# Patient Record
Sex: FEMALE | Race: WHITE | Employment: FULL TIME | URBAN - METROPOLITAN AREA
[De-identification: names, ages, dates, MRNs, and addresses within clinical notes are randomized per-mention and may not be internally consistent; named-entity substitution may affect disease eponyms.]

---

## 2018-04-13 ENCOUNTER — APPOINTMENT (OUTPATIENT)
Dept: GENERAL RADIOLOGY | Age: 43
End: 2018-04-13
Payer: COMMERCIAL

## 2018-04-13 ENCOUNTER — HOSPITAL ENCOUNTER (EMERGENCY)
Age: 43
Discharge: HOME | End: 2018-04-13
Attending: EMERGENCY MEDICINE
Payer: COMMERCIAL

## 2018-04-13 VITALS
BODY MASS INDEX: 40.48 KG/M2 | RESPIRATION RATE: 18 BRPM | HEIGHT: 62 IN | TEMPERATURE: 97.9 F | DIASTOLIC BLOOD PRESSURE: 101 MMHG | SYSTOLIC BLOOD PRESSURE: 149 MMHG | HEART RATE: 108 BPM | OXYGEN SATURATION: 97 % | WEIGHT: 220 LBS

## 2018-04-13 DIAGNOSIS — S92.912A: Primary | ICD-10-CM

## 2018-04-13 PROCEDURE — 73562 X-RAY EXAM OF KNEE 3: CPT

## 2018-04-13 PROCEDURE — 73590 X-RAY EXAM OF LOWER LEG: CPT

## 2018-04-13 PROCEDURE — 6360000002 HC RX W HCPCS: Performed by: STUDENT IN AN ORGANIZED HEALTH CARE EDUCATION/TRAINING PROGRAM

## 2018-04-13 PROCEDURE — 90715 TDAP VACCINE 7 YRS/> IM: CPT | Performed by: STUDENT IN AN ORGANIZED HEALTH CARE EDUCATION/TRAINING PROGRAM

## 2018-04-13 PROCEDURE — 73630 X-RAY EXAM OF FOOT: CPT

## 2018-04-13 PROCEDURE — 73610 X-RAY EXAM OF ANKLE: CPT

## 2018-04-13 PROCEDURE — 96374 THER/PROPH/DIAG INJ IV PUSH: CPT

## 2018-04-13 PROCEDURE — G0382 LEV 3 HOSP TYPE B ED VISIT: HCPCS

## 2018-04-13 PROCEDURE — 90471 IMMUNIZATION ADMIN: CPT | Performed by: STUDENT IN AN ORGANIZED HEALTH CARE EDUCATION/TRAINING PROGRAM

## 2018-04-13 RX ORDER — KETOROLAC TROMETHAMINE 15 MG/ML
15 INJECTION, SOLUTION INTRAMUSCULAR; INTRAVENOUS ONCE
Status: COMPLETED | OUTPATIENT
Start: 2018-04-13 | End: 2018-04-13

## 2018-04-13 RX ADMIN — KETOROLAC TROMETHAMINE 15 MG: 15 INJECTION, SOLUTION INTRAMUSCULAR; INTRAVENOUS at 12:55

## 2018-04-13 RX ADMIN — TETANUS TOXOID, REDUCED DIPHTHERIA TOXOID AND ACELLULAR PERTUSSIS VACCINE, ADSORBED 0.5 ML: 5; 2.5; 8; 8; 2.5 SUSPENSION INTRAMUSCULAR at 13:43

## 2018-04-13 ASSESSMENT — ENCOUNTER SYMPTOMS
VOMITING: 0
ABDOMINAL PAIN: 0
COUGH: 0
SHORTNESS OF BREATH: 0
COLOR CHANGE: 1
WHEEZING: 0
NAUSEA: 0
CONSTIPATION: 0
DIARRHEA: 0

## 2018-04-13 ASSESSMENT — PAIN DESCRIPTION - ORIENTATION: ORIENTATION: LEFT

## 2018-04-13 ASSESSMENT — PAIN SCALES - GENERAL
PAINLEVEL_OUTOF10: 7
PAINLEVEL_OUTOF10: 7

## 2018-04-13 ASSESSMENT — PAIN DESCRIPTION - DESCRIPTORS: DESCRIPTORS: DISCOMFORT;SORE

## 2018-04-13 ASSESSMENT — PAIN DESCRIPTION - LOCATION: LOCATION: LEG

## 2018-05-31 ENCOUNTER — HOSPITAL ENCOUNTER (OUTPATIENT)
Age: 43
Setting detail: SPECIMEN
Discharge: HOME | End: 2018-05-31
Payer: COMMERCIAL

## 2018-05-31 DIAGNOSIS — Z01.818 PREOPERATIVE CLEARANCE: ICD-10-CM

## 2018-05-31 LAB
ABSOLUTE EOS #: 0.23 K/UL (ref 0–0.44)
ABSOLUTE IMMATURE GRANULOCYTE: 0.06 K/UL (ref 0–0.3)
ABSOLUTE LYMPH #: 1.82 K/UL (ref 1.1–3.7)
ABSOLUTE MONO #: 0.75 K/UL (ref 0.1–1.2)
ANION GAP SERPL CALCULATED.3IONS-SCNC: 17 MMOL/L (ref 9–17)
BASOPHILS # BLD: 1 % (ref 0–2)
BASOPHILS ABSOLUTE: 0.08 K/UL (ref 0–0.2)
BUN BLDV-MCNC: 8 MG/DL (ref 6–20)
BUN/CREAT BLD: NORMAL (ref 9–20)
CALCIUM SERPL-MCNC: 9.1 MG/DL (ref 8.6–10.4)
CHLORIDE BLD-SCNC: 100 MMOL/L (ref 98–107)
CO2: 23 MMOL/L (ref 20–31)
CREAT SERPL-MCNC: 0.71 MG/DL (ref 0.5–0.9)
DIFFERENTIAL TYPE: ABNORMAL
EOSINOPHILS RELATIVE PERCENT: 3 % (ref 1–4)
GFR AFRICAN AMERICAN: >60 ML/MIN
GFR NON-AFRICAN AMERICAN: >60 ML/MIN
GFR SERPL CREATININE-BSD FRML MDRD: NORMAL ML/MIN/{1.73_M2}
GFR SERPL CREATININE-BSD FRML MDRD: NORMAL ML/MIN/{1.73_M2}
GLUCOSE BLD-MCNC: 97 MG/DL (ref 70–99)
HCT VFR BLD CALC: 41.6 % (ref 36.3–47.1)
HEMOGLOBIN: 13.6 G/DL (ref 11.9–15.1)
IMMATURE GRANULOCYTES: 1 %
LYMPHOCYTES # BLD: 21 % (ref 24–43)
MCH RBC QN AUTO: 30.8 PG (ref 25.2–33.5)
MCHC RBC AUTO-ENTMCNC: 32.7 G/DL (ref 28.4–34.8)
MCV RBC AUTO: 94.1 FL (ref 82.6–102.9)
MONOCYTES # BLD: 9 % (ref 3–12)
NRBC AUTOMATED: 0 PER 100 WBC
PDW BLD-RTO: 12.9 % (ref 11.8–14.4)
PLATELET # BLD: 386 K/UL (ref 138–453)
PLATELET ESTIMATE: ABNORMAL
PMV BLD AUTO: 11.3 FL (ref 8.1–13.5)
POTASSIUM SERPL-SCNC: 4.5 MMOL/L (ref 3.7–5.3)
RBC # BLD: 4.42 M/UL (ref 3.95–5.11)
RBC # BLD: ABNORMAL 10*6/UL
SEG NEUTROPHILS: 65 % (ref 36–65)
SEGMENTED NEUTROPHILS ABSOLUTE COUNT: 5.91 K/UL (ref 1.5–8.1)
SODIUM BLD-SCNC: 140 MMOL/L (ref 135–144)
WBC # BLD: 8.9 K/UL (ref 3.5–11.3)
WBC # BLD: ABNORMAL 10*3/UL

## 2018-06-11 ENCOUNTER — HOSPITAL ENCOUNTER (OUTPATIENT)
Dept: MAMMOGRAPHY | Facility: CLINIC | Age: 43
Discharge: HOME | End: 2018-06-13
Payer: COMMERCIAL

## 2018-06-11 ENCOUNTER — HOSPITAL ENCOUNTER (OUTPATIENT)
Age: 43
Setting detail: SPECIMEN
Discharge: HOME | End: 2018-06-11
Payer: COMMERCIAL

## 2018-06-11 DIAGNOSIS — Z01.419 WELL WOMAN EXAM WITH ROUTINE GYNECOLOGICAL EXAM: ICD-10-CM

## 2018-06-11 DIAGNOSIS — Z12.39 SCREENING FOR BREAST CANCER: ICD-10-CM

## 2018-06-11 PROCEDURE — 77067 SCR MAMMO BI INCL CAD: CPT

## 2018-06-13 LAB
HPV SAMPLE: NORMAL
HPV SOURCE: NORMAL
HPV, GENOTYPE 16: NOT DETECTED
HPV, GENOTYPE 18: NOT DETECTED
HPV, HIGH RISK OTHER: NOT DETECTED
HPV, INTERPRETATION: NORMAL

## 2018-06-28 LAB — CYTOLOGY REPORT: NORMAL

## 2019-04-17 ENCOUNTER — HOSPITAL ENCOUNTER (OUTPATIENT)
Age: 44
Setting detail: SPECIMEN
Discharge: HOME | End: 2019-04-17
Payer: COMMERCIAL

## 2019-04-17 DIAGNOSIS — R63.5 WEIGHT GAIN: ICD-10-CM

## 2019-04-23 LAB
CORTISOL (UR), FREE: 12.4 UG/D
CORTISOL URINE, FREE (/G CRT): 4.2 UG/L
CORTISOL, URINE RATIO CREATININE: 7.92 UG/G CRT
CORTISOL, URINE: NORMAL
CREATININE URINE /24 HR: 1568 MG/D (ref 700–1600)
CREATININE URINE /VOLUME: 53 MG/DL
HOURS COLLECTED: 24
URINE VOLUME: 2959

## 2019-09-20 ENCOUNTER — TELEPHONE (OUTPATIENT)
Dept: GASTROENTEROLOGY | Age: 44
End: 2019-09-20

## 2019-10-16 ENCOUNTER — OFFICE VISIT (OUTPATIENT)
Dept: GASTROENTEROLOGY | Age: 44
End: 2019-10-16
Payer: COMMERCIAL

## 2019-10-16 VITALS
SYSTOLIC BLOOD PRESSURE: 162 MMHG | HEART RATE: 70 BPM | BODY MASS INDEX: 46.4 KG/M2 | WEIGHT: 253.7 LBS | DIASTOLIC BLOOD PRESSURE: 99 MMHG

## 2019-10-16 DIAGNOSIS — R19.7 DIARRHEA, UNSPECIFIED TYPE: Primary | ICD-10-CM

## 2019-10-16 DIAGNOSIS — R14.0 BLOATING: ICD-10-CM

## 2019-10-16 DIAGNOSIS — K21.9 GASTROESOPHAGEAL REFLUX DISEASE, ESOPHAGITIS PRESENCE NOT SPECIFIED: ICD-10-CM

## 2019-10-16 PROCEDURE — 99244 OFF/OP CNSLTJ NEW/EST MOD 40: CPT | Performed by: INTERNAL MEDICINE

## 2019-10-16 RX ORDER — POLYETHYLENE GLYCOL 3350 17 G/17G
POWDER, FOR SOLUTION ORAL
Qty: 238 G | Refills: 0 | Status: SHIPPED | OUTPATIENT
Start: 2019-10-16 | End: 2019-11-15

## 2019-10-16 ASSESSMENT — ENCOUNTER SYMPTOMS
SORE THROAT: 0
VOMITING: 0
TROUBLE SWALLOWING: 0
CONSTIPATION: 0
WHEEZING: 0
COUGH: 0
BLOOD IN STOOL: 0
ABDOMINAL DISTENTION: 1
NAUSEA: 1
ALLERGIC/IMMUNOLOGIC NEGATIVE: 1
CHOKING: 1
SINUS PRESSURE: 0
VOICE CHANGE: 1
RECTAL PAIN: 0
BACK PAIN: 1
ABDOMINAL PAIN: 1
DIARRHEA: 0
ANAL BLEEDING: 0

## 2019-10-17 ENCOUNTER — HOSPITAL ENCOUNTER (OUTPATIENT)
Age: 44
Setting detail: SPECIMEN
Discharge: HOME | End: 2019-10-17
Payer: COMMERCIAL

## 2019-10-17 DIAGNOSIS — R19.7 DIARRHEA, UNSPECIFIED TYPE: ICD-10-CM

## 2019-10-17 LAB
ABSOLUTE EOS #: 0.28 K/UL (ref 0–0.44)
ABSOLUTE IMMATURE GRANULOCYTE: 0.1 K/UL (ref 0–0.3)
ABSOLUTE LYMPH #: 2.38 K/UL (ref 1.1–3.7)
ABSOLUTE MONO #: 0.94 K/UL (ref 0.1–1.2)
ALBUMIN SERPL-MCNC: 4.4 G/DL (ref 3.5–5.2)
ALBUMIN/GLOBULIN RATIO: 1.4 (ref 1–2.5)
ALP BLD-CCNC: 77 U/L (ref 35–104)
ALT SERPL-CCNC: 111 U/L (ref 5–33)
ANION GAP SERPL CALCULATED.3IONS-SCNC: 13 MMOL/L (ref 9–17)
AST SERPL-CCNC: 60 U/L
BASOPHILS # BLD: 1 % (ref 0–2)
BASOPHILS ABSOLUTE: 0.1 K/UL (ref 0–0.2)
BILIRUB SERPL-MCNC: 0.6 MG/DL (ref 0.3–1.2)
BUN BLDV-MCNC: 10 MG/DL (ref 6–20)
BUN/CREAT BLD: ABNORMAL (ref 9–20)
CALCIUM SERPL-MCNC: 9.4 MG/DL (ref 8.6–10.4)
CHLORIDE BLD-SCNC: 103 MMOL/L (ref 98–107)
CO2: 26 MMOL/L (ref 20–31)
CREAT SERPL-MCNC: 0.76 MG/DL (ref 0.5–0.9)
DIFFERENTIAL TYPE: ABNORMAL
EOSINOPHILS RELATIVE PERCENT: 3 % (ref 1–4)
GFR AFRICAN AMERICAN: >60 ML/MIN
GFR NON-AFRICAN AMERICAN: >60 ML/MIN
GFR SERPL CREATININE-BSD FRML MDRD: ABNORMAL ML/MIN/{1.73_M2}
GFR SERPL CREATININE-BSD FRML MDRD: ABNORMAL ML/MIN/{1.73_M2}
GLUCOSE BLD-MCNC: 111 MG/DL (ref 70–99)
HCT VFR BLD CALC: 45 % (ref 36.3–47.1)
HEMOGLOBIN: 14 G/DL (ref 11.9–15.1)
IMMATURE GRANULOCYTES: 1 %
LYMPHOCYTES # BLD: 27 % (ref 24–43)
MCH RBC QN AUTO: 30.5 PG (ref 25.2–33.5)
MCHC RBC AUTO-ENTMCNC: 31.1 G/DL (ref 28.4–34.8)
MCV RBC AUTO: 98 FL (ref 82.6–102.9)
MONOCYTES # BLD: 11 % (ref 3–12)
NRBC AUTOMATED: 0 PER 100 WBC
PDW BLD-RTO: 12.9 % (ref 11.8–14.4)
PLATELET # BLD: 291 K/UL (ref 138–453)
PLATELET ESTIMATE: ABNORMAL
PMV BLD AUTO: 12.3 FL (ref 8.1–13.5)
POTASSIUM SERPL-SCNC: 5 MMOL/L (ref 3.7–5.3)
RBC # BLD: 4.59 M/UL (ref 3.95–5.11)
RBC # BLD: ABNORMAL 10*6/UL
SEDIMENTATION RATE, ERYTHROCYTE: 15 MM (ref 0–20)
SEG NEUTROPHILS: 57 % (ref 36–65)
SEGMENTED NEUTROPHILS ABSOLUTE COUNT: 4.98 K/UL (ref 1.5–8.1)
SODIUM BLD-SCNC: 142 MMOL/L (ref 135–144)
TOTAL PROTEIN: 7.5 G/DL (ref 6.4–8.3)
WBC # BLD: 8.8 K/UL (ref 3.5–11.3)
WBC # BLD: ABNORMAL 10*3/UL

## 2019-10-18 LAB — TISSUE TRANSGLUTAMINASE IGA: 1 U/ML

## 2019-10-21 ENCOUNTER — TELEPHONE (OUTPATIENT)
Dept: GASTROENTEROLOGY | Age: 44
End: 2019-10-21

## 2019-10-23 ENCOUNTER — ANESTHESIA (OUTPATIENT)
Dept: ENDOSCOPY | Age: 44
End: 2019-10-23
Payer: COMMERCIAL

## 2019-10-23 ENCOUNTER — ANESTHESIA EVENT (OUTPATIENT)
Dept: ENDOSCOPY | Age: 44
End: 2019-10-23
Payer: COMMERCIAL

## 2019-10-23 ENCOUNTER — HOSPITAL ENCOUNTER (OUTPATIENT)
Age: 44
Setting detail: OUTPATIENT SURGERY
Discharge: HOME | End: 2019-10-23
Attending: INTERNAL MEDICINE | Admitting: INTERNAL MEDICINE
Payer: COMMERCIAL

## 2019-10-23 VITALS
SYSTOLIC BLOOD PRESSURE: 138 MMHG | TEMPERATURE: 97.7 F | RESPIRATION RATE: 12 BRPM | BODY MASS INDEX: 44.16 KG/M2 | HEART RATE: 64 BPM | DIASTOLIC BLOOD PRESSURE: 86 MMHG | OXYGEN SATURATION: 97 % | HEIGHT: 62 IN | WEIGHT: 240 LBS

## 2019-10-23 VITALS — SYSTOLIC BLOOD PRESSURE: 145 MMHG | OXYGEN SATURATION: 100 % | DIASTOLIC BLOOD PRESSURE: 66 MMHG

## 2019-10-23 PROCEDURE — 7100000001 HC PACU RECOVERY - ADDTL 15 MIN: Performed by: INTERNAL MEDICINE

## 2019-10-23 PROCEDURE — 6370000000 HC RX 637 (ALT 250 FOR IP): Performed by: ANESTHESIOLOGY

## 2019-10-23 PROCEDURE — 2580000003 HC RX 258: Performed by: ANESTHESIOLOGY

## 2019-10-23 PROCEDURE — 6360000002 HC RX W HCPCS: Performed by: ANESTHESIOLOGY

## 2019-10-23 PROCEDURE — 2500000003 HC RX 250 WO HCPCS: Performed by: NURSE ANESTHETIST, CERTIFIED REGISTERED

## 2019-10-23 PROCEDURE — 43239 EGD BIOPSY SINGLE/MULTIPLE: CPT | Performed by: INTERNAL MEDICINE

## 2019-10-23 PROCEDURE — 7100000000 HC PACU RECOVERY - FIRST 15 MIN: Performed by: INTERNAL MEDICINE

## 2019-10-23 PROCEDURE — 3700000000 HC ANESTHESIA ATTENDED CARE: Performed by: INTERNAL MEDICINE

## 2019-10-23 PROCEDURE — 3700000001 HC ADD 15 MINUTES (ANESTHESIA): Performed by: INTERNAL MEDICINE

## 2019-10-23 PROCEDURE — 88305 TISSUE EXAM BY PATHOLOGIST: CPT

## 2019-10-23 PROCEDURE — 6360000002 HC RX W HCPCS: Performed by: NURSE ANESTHETIST, CERTIFIED REGISTERED

## 2019-10-23 PROCEDURE — 2709999900 HC NON-CHARGEABLE SUPPLY: Performed by: INTERNAL MEDICINE

## 2019-10-23 PROCEDURE — 3609010300 HC COLONOSCOPY W/BIOPSY SINGLE/MULTIPLE: Performed by: INTERNAL MEDICINE

## 2019-10-23 PROCEDURE — 3609012400 HC EGD TRANSORAL BIOPSY SINGLE/MULTIPLE: Performed by: INTERNAL MEDICINE

## 2019-10-23 PROCEDURE — 45380 COLONOSCOPY AND BIOPSY: CPT | Performed by: INTERNAL MEDICINE

## 2019-10-23 RX ORDER — SODIUM CHLORIDE, SODIUM LACTATE, POTASSIUM CHLORIDE, CALCIUM CHLORIDE 600; 310; 30; 20 MG/100ML; MG/100ML; MG/100ML; MG/100ML
INJECTION, SOLUTION INTRAVENOUS CONTINUOUS
Status: DISCONTINUED | OUTPATIENT
Start: 2019-10-23 | End: 2019-10-23 | Stop reason: HOSPADM

## 2019-10-23 RX ORDER — PROMETHAZINE HYDROCHLORIDE 25 MG/ML
6.25 INJECTION, SOLUTION INTRAMUSCULAR; INTRAVENOUS
Status: DISCONTINUED | OUTPATIENT
Start: 2019-10-23 | End: 2019-10-23 | Stop reason: CLARIF

## 2019-10-23 RX ORDER — DIPHENHYDRAMINE HYDROCHLORIDE 50 MG/ML
12.5 INJECTION INTRAMUSCULAR; INTRAVENOUS
Status: DISCONTINUED | OUTPATIENT
Start: 2019-10-23 | End: 2019-10-23 | Stop reason: HOSPADM

## 2019-10-23 RX ORDER — PROPOFOL 10 MG/ML
INJECTION, EMULSION INTRAVENOUS PRN
Status: DISCONTINUED | OUTPATIENT
Start: 2019-10-23 | End: 2019-10-23 | Stop reason: SDUPTHER

## 2019-10-23 RX ORDER — 0.9 % SODIUM CHLORIDE 0.9 %
500 INTRAVENOUS SOLUTION INTRAVENOUS
Status: DISCONTINUED | OUTPATIENT
Start: 2019-10-23 | End: 2019-10-23 | Stop reason: HOSPADM

## 2019-10-23 RX ORDER — HYDRALAZINE HYDROCHLORIDE 20 MG/ML
5 INJECTION INTRAMUSCULAR; INTRAVENOUS EVERY 10 MIN PRN
Status: DISCONTINUED | OUTPATIENT
Start: 2019-10-23 | End: 2019-10-23 | Stop reason: HOSPADM

## 2019-10-23 RX ORDER — LIDOCAINE HYDROCHLORIDE 10 MG/ML
INJECTION, SOLUTION EPIDURAL; INFILTRATION; INTRACAUDAL; PERINEURAL PRN
Status: DISCONTINUED | OUTPATIENT
Start: 2019-10-23 | End: 2019-10-23 | Stop reason: SDUPTHER

## 2019-10-23 RX ORDER — SCOLOPAMINE TRANSDERMAL SYSTEM 1 MG/1
1 PATCH, EXTENDED RELEASE TRANSDERMAL
Status: DISCONTINUED | OUTPATIENT
Start: 2019-10-23 | End: 2019-10-23 | Stop reason: HOSPADM

## 2019-10-23 RX ORDER — ONDANSETRON 2 MG/ML
4 INJECTION INTRAMUSCULAR; INTRAVENOUS
Status: COMPLETED | OUTPATIENT
Start: 2019-10-23 | End: 2019-10-23

## 2019-10-23 RX ORDER — LABETALOL 20 MG/4 ML (5 MG/ML) INTRAVENOUS SYRINGE
5 EVERY 10 MIN PRN
Status: DISCONTINUED | OUTPATIENT
Start: 2019-10-23 | End: 2019-10-23 | Stop reason: HOSPADM

## 2019-10-23 RX ADMIN — ONDANSETRON 4 MG: 2 INJECTION INTRAMUSCULAR; INTRAVENOUS at 07:39

## 2019-10-23 RX ADMIN — SODIUM CHLORIDE, POTASSIUM CHLORIDE, SODIUM LACTATE AND CALCIUM CHLORIDE: 600; 310; 30; 20 INJECTION, SOLUTION INTRAVENOUS at 06:49

## 2019-10-23 RX ADMIN — PROPOFOL 250 MG: 10 INJECTION, EMULSION INTRAVENOUS at 07:42

## 2019-10-23 RX ADMIN — LIDOCAINE HYDROCHLORIDE 50 MG: 10 INJECTION, SOLUTION EPIDURAL; INFILTRATION; INTRACAUDAL at 07:42

## 2019-10-23 RX ADMIN — PROPOFOL 370 MG: 10 INJECTION, EMULSION INTRAVENOUS at 07:53

## 2019-10-23 RX ADMIN — PROMETHAZINE HYDROCHLORIDE 6.25 MG: 25 INJECTION INTRAMUSCULAR; INTRAVENOUS at 08:49

## 2019-10-23 ASSESSMENT — PULMONARY FUNCTION TESTS
PIF_VALUE: 0
PIF_VALUE: 1
PIF_VALUE: 0

## 2019-10-23 ASSESSMENT — PAIN SCALES - GENERAL: PAINLEVEL_OUTOF10: 0

## 2019-10-23 ASSESSMENT — PAIN - FUNCTIONAL ASSESSMENT: PAIN_FUNCTIONAL_ASSESSMENT: 0-10

## 2019-10-23 ASSESSMENT — PAIN DESCRIPTION - DESCRIPTORS: DESCRIPTORS: ACHING

## 2019-10-24 LAB — SURGICAL PATHOLOGY REPORT: NORMAL

## 2019-10-28 PROBLEM — Z86.010 HISTORY OF COLON POLYPS: Status: ACTIVE | Noted: 2019-10-23

## 2019-10-28 PROBLEM — Z86.0100 HISTORY OF COLON POLYPS: Status: ACTIVE | Noted: 2019-10-23

## 2019-12-02 ENCOUNTER — OFFICE VISIT (OUTPATIENT)
Dept: GASTROENTEROLOGY | Age: 44
End: 2019-12-02
Payer: COMMERCIAL

## 2019-12-02 VITALS
DIASTOLIC BLOOD PRESSURE: 106 MMHG | BODY MASS INDEX: 46.73 KG/M2 | SYSTOLIC BLOOD PRESSURE: 154 MMHG | WEIGHT: 255.5 LBS | HEART RATE: 94 BPM

## 2019-12-02 DIAGNOSIS — K21.9 GASTROESOPHAGEAL REFLUX DISEASE, ESOPHAGITIS PRESENCE NOT SPECIFIED: ICD-10-CM

## 2019-12-02 DIAGNOSIS — R14.0 BLOATING: ICD-10-CM

## 2019-12-02 DIAGNOSIS — R19.7 DIARRHEA, UNSPECIFIED TYPE: Primary | ICD-10-CM

## 2019-12-02 PROCEDURE — 99213 OFFICE O/P EST LOW 20 MIN: CPT | Performed by: INTERNAL MEDICINE

## 2019-12-02 ASSESSMENT — ENCOUNTER SYMPTOMS
SORE THROAT: 0
RECTAL PAIN: 0
NAUSEA: 0
TROUBLE SWALLOWING: 0
RESPIRATORY NEGATIVE: 1
ANAL BLEEDING: 0
CHOKING: 0
ALLERGIC/IMMUNOLOGIC NEGATIVE: 1
VOMITING: 0
BACK PAIN: 1
WHEEZING: 0
COUGH: 0
VOICE CHANGE: 0
ABDOMINAL PAIN: 0
ABDOMINAL DISTENTION: 1
SINUS PRESSURE: 1
BLOOD IN STOOL: 0
DIARRHEA: 1
CONSTIPATION: 1

## 2020-01-08 ENCOUNTER — HOSPITAL ENCOUNTER (OUTPATIENT)
Age: 45
Setting detail: SPECIMEN
Discharge: HOME | End: 2020-01-08
Payer: COMMERCIAL

## 2020-01-09 LAB
C DIFF AG + TOXIN: NEGATIVE
CAMPYLOBACTER PCR: ABNORMAL
E COLI ENTEROTOXIGENIC PCR: ABNORMAL
PLESIOMONAS SHIGELLOIDES PCR: ABNORMAL
SALMONELLA PCR: ABNORMAL
SHIGATOXIN GENE PCR: ABNORMAL
SHIGELLA SP PCR: ABNORMAL
SPECIMEN DESCRIPTION: ABNORMAL
SPECIMEN DESCRIPTION: NORMAL
VIBRIO PCR: ABNORMAL
YERSINIA ENTEROCOLITICA PCR: ABNORMAL

## 2020-01-17 ENCOUNTER — APPOINTMENT (OUTPATIENT)
Dept: CT IMAGING | Age: 45
End: 2020-01-17
Payer: COMMERCIAL

## 2020-01-17 ENCOUNTER — APPOINTMENT (OUTPATIENT)
Dept: ULTRASOUND IMAGING | Age: 45
End: 2020-01-17
Payer: COMMERCIAL

## 2020-01-17 ENCOUNTER — APPOINTMENT (OUTPATIENT)
Dept: GENERAL RADIOLOGY | Age: 45
End: 2020-01-17
Payer: COMMERCIAL

## 2020-01-17 ENCOUNTER — HOSPITAL ENCOUNTER (OUTPATIENT)
Age: 45
Setting detail: OBSERVATION
Discharge: HOME | End: 2020-01-17
Attending: EMERGENCY MEDICINE | Admitting: EMERGENCY MEDICINE
Payer: COMMERCIAL

## 2020-01-17 VITALS
RESPIRATION RATE: 16 BRPM | HEART RATE: 80 BPM | WEIGHT: 254.6 LBS | BODY MASS INDEX: 46.85 KG/M2 | HEIGHT: 62 IN | DIASTOLIC BLOOD PRESSURE: 78 MMHG | OXYGEN SATURATION: 96 % | TEMPERATURE: 98.6 F | SYSTOLIC BLOOD PRESSURE: 144 MMHG

## 2020-01-17 PROBLEM — R07.9 CHEST PAIN: Status: ACTIVE | Noted: 2020-01-17

## 2020-01-17 LAB
ABSOLUTE EOS #: 0.19 K/UL (ref 0–0.44)
ABSOLUTE EOS #: 0.22 K/UL (ref 0–0.44)
ABSOLUTE IMMATURE GRANULOCYTE: 0.12 K/UL (ref 0–0.3)
ABSOLUTE IMMATURE GRANULOCYTE: 0.2 K/UL (ref 0–0.3)
ABSOLUTE LYMPH #: 2.52 K/UL (ref 1.1–3.7)
ABSOLUTE LYMPH #: 3.12 K/UL (ref 1.1–3.7)
ABSOLUTE MONO #: 0.92 K/UL (ref 0.1–1.2)
ABSOLUTE MONO #: 1.31 K/UL (ref 0.1–1.2)
ALBUMIN SERPL-MCNC: 3.5 G/DL (ref 3.5–5.2)
ALBUMIN SERPL-MCNC: 4 G/DL (ref 3.5–5.2)
ALBUMIN/GLOBULIN RATIO: 1.3 (ref 1–2.5)
ALBUMIN/GLOBULIN RATIO: 1.3 (ref 1–2.5)
ALP BLD-CCNC: 54 U/L (ref 35–104)
ALP BLD-CCNC: 64 U/L (ref 35–104)
ALT SERPL-CCNC: 31 U/L (ref 5–33)
ALT SERPL-CCNC: 36 U/L (ref 5–33)
ANION GAP SERPL CALCULATED.3IONS-SCNC: 12 MMOL/L (ref 9–17)
ANION GAP SERPL CALCULATED.3IONS-SCNC: 13 MMOL/L (ref 9–17)
AST SERPL-CCNC: 20 U/L
AST SERPL-CCNC: 27 U/L
BASOPHILS # BLD: 1 % (ref 0–2)
BASOPHILS # BLD: 1 % (ref 0–2)
BASOPHILS ABSOLUTE: 0.08 K/UL (ref 0–0.2)
BASOPHILS ABSOLUTE: 0.08 K/UL (ref 0–0.2)
BILIRUB SERPL-MCNC: 0.45 MG/DL (ref 0.3–1.2)
BILIRUB SERPL-MCNC: 0.65 MG/DL (ref 0.3–1.2)
BILIRUBIN DIRECT: 0.15 MG/DL
BILIRUBIN, INDIRECT: 0.3 MG/DL (ref 0–1)
BUN BLDV-MCNC: 10 MG/DL (ref 6–20)
BUN BLDV-MCNC: 8 MG/DL (ref 6–20)
BUN/CREAT BLD: ABNORMAL (ref 9–20)
BUN/CREAT BLD: ABNORMAL (ref 9–20)
CALCIUM SERPL-MCNC: 8.4 MG/DL (ref 8.6–10.4)
CALCIUM SERPL-MCNC: 8.6 MG/DL (ref 8.6–10.4)
CHLORIDE BLD-SCNC: 104 MMOL/L (ref 98–107)
CHLORIDE BLD-SCNC: 99 MMOL/L (ref 98–107)
CO2: 23 MMOL/L (ref 20–31)
CO2: 24 MMOL/L (ref 20–31)
CREAT SERPL-MCNC: 0.69 MG/DL (ref 0.5–0.9)
CREAT SERPL-MCNC: 0.93 MG/DL (ref 0.5–0.9)
D-DIMER QUANTITATIVE: 1.25 MG/L FEU
DIFFERENTIAL TYPE: ABNORMAL
DIFFERENTIAL TYPE: ABNORMAL
EOSINOPHILS RELATIVE PERCENT: 2 % (ref 1–4)
EOSINOPHILS RELATIVE PERCENT: 2 % (ref 1–4)
GFR AFRICAN AMERICAN: >60 ML/MIN
GFR AFRICAN AMERICAN: >60 ML/MIN
GFR NON-AFRICAN AMERICAN: >60 ML/MIN
GFR NON-AFRICAN AMERICAN: >60 ML/MIN
GFR SERPL CREATININE-BSD FRML MDRD: ABNORMAL ML/MIN/{1.73_M2}
GLOBULIN: ABNORMAL G/DL (ref 1.5–3.8)
GLUCOSE BLD-MCNC: 104 MG/DL (ref 70–99)
GLUCOSE BLD-MCNC: 75 MG/DL (ref 70–99)
HCT VFR BLD CALC: 37.6 % (ref 36.3–47.1)
HCT VFR BLD CALC: 39.7 % (ref 36.3–47.1)
HEMOGLOBIN: 12 G/DL (ref 11.9–15.1)
HEMOGLOBIN: 12.8 G/DL (ref 11.9–15.1)
IMMATURE GRANULOCYTES: 1 %
IMMATURE GRANULOCYTES: 1 %
LIPASE: 114 U/L (ref 13–60)
LIPASE: 50 U/L (ref 13–60)
LYMPHOCYTES # BLD: 22 % (ref 24–43)
LYMPHOCYTES # BLD: 23 % (ref 24–43)
MCH RBC QN AUTO: 29.6 PG (ref 25.2–33.5)
MCH RBC QN AUTO: 29.7 PG (ref 25.2–33.5)
MCHC RBC AUTO-ENTMCNC: 31.9 G/DL (ref 28.4–34.8)
MCHC RBC AUTO-ENTMCNC: 32.2 G/DL (ref 28.4–34.8)
MCV RBC AUTO: 92.1 FL (ref 82.6–102.9)
MCV RBC AUTO: 92.8 FL (ref 82.6–102.9)
MONOCYTES # BLD: 8 % (ref 3–12)
MONOCYTES # BLD: 9 % (ref 3–12)
NRBC AUTOMATED: 0 PER 100 WBC
NRBC AUTOMATED: 0 PER 100 WBC
PDW BLD-RTO: 13 % (ref 11.8–14.4)
PDW BLD-RTO: 13 % (ref 11.8–14.4)
PLATELET # BLD: 329 K/UL (ref 138–453)
PLATELET # BLD: 363 K/UL (ref 138–453)
PLATELET ESTIMATE: ABNORMAL
PLATELET ESTIMATE: ABNORMAL
PMV BLD AUTO: 10.7 FL (ref 8.1–13.5)
PMV BLD AUTO: 10.9 FL (ref 8.1–13.5)
POTASSIUM SERPL-SCNC: 3.4 MMOL/L (ref 3.7–5.3)
POTASSIUM SERPL-SCNC: 3.5 MMOL/L (ref 3.7–5.3)
RBC # BLD: 4.05 M/UL (ref 3.95–5.11)
RBC # BLD: 4.31 M/UL (ref 3.95–5.11)
RBC # BLD: ABNORMAL 10*6/UL
RBC # BLD: ABNORMAL 10*6/UL
SEG NEUTROPHILS: 65 % (ref 36–65)
SEG NEUTROPHILS: 65 % (ref 36–65)
SEGMENTED NEUTROPHILS ABSOLUTE COUNT: 7.26 K/UL (ref 1.5–8.1)
SEGMENTED NEUTROPHILS ABSOLUTE COUNT: 9.49 K/UL (ref 1.5–8.1)
SODIUM BLD-SCNC: 136 MMOL/L (ref 135–144)
SODIUM BLD-SCNC: 139 MMOL/L (ref 135–144)
TOTAL PROTEIN: 6.3 G/DL (ref 6.4–8.3)
TOTAL PROTEIN: 7.2 G/DL (ref 6.4–8.3)
TROPONIN INTERP: NORMAL
TROPONIN T: NORMAL NG/ML
TROPONIN, HIGH SENSITIVITY: <6 NG/L (ref 0–14)
WBC # BLD: 11.1 K/UL (ref 3.5–11.3)
WBC # BLD: 14.4 K/UL (ref 3.5–11.3)
WBC # BLD: ABNORMAL 10*3/UL
WBC # BLD: ABNORMAL 10*3/UL

## 2020-01-17 PROCEDURE — 71275 CT ANGIOGRAPHY CHEST: CPT

## 2020-01-17 PROCEDURE — 80053 COMPREHEN METABOLIC PANEL: CPT

## 2020-01-17 PROCEDURE — 93005 ELECTROCARDIOGRAM TRACING: CPT | Performed by: EMERGENCY MEDICINE

## 2020-01-17 PROCEDURE — 96375 TX/PRO/DX INJ NEW DRUG ADDON: CPT

## 2020-01-17 PROCEDURE — 85025 COMPLETE CBC W/AUTO DIFF WBC: CPT

## 2020-01-17 PROCEDURE — 71046 X-RAY EXAM CHEST 2 VIEWS: CPT

## 2020-01-17 PROCEDURE — 6360000002 HC RX W HCPCS: Performed by: STUDENT IN AN ORGANIZED HEALTH CARE EDUCATION/TRAINING PROGRAM

## 2020-01-17 PROCEDURE — 84484 ASSAY OF TROPONIN QUANT: CPT

## 2020-01-17 PROCEDURE — 99285 EMERGENCY DEPT VISIT HI MDM: CPT

## 2020-01-17 PROCEDURE — 96376 TX/PRO/DX INJ SAME DRUG ADON: CPT

## 2020-01-17 PROCEDURE — 96374 THER/PROPH/DIAG INJ IV PUSH: CPT

## 2020-01-17 PROCEDURE — 83690 ASSAY OF LIPASE: CPT

## 2020-01-17 PROCEDURE — 76705 ECHO EXAM OF ABDOMEN: CPT

## 2020-01-17 PROCEDURE — 93005 ELECTROCARDIOGRAM TRACING: CPT | Performed by: STUDENT IN AN ORGANIZED HEALTH CARE EDUCATION/TRAINING PROGRAM

## 2020-01-17 PROCEDURE — 96365 THER/PROPH/DIAG IV INF INIT: CPT

## 2020-01-17 PROCEDURE — 74174 CTA ABD&PLVS W/CONTRAST: CPT

## 2020-01-17 PROCEDURE — 36415 COLL VENOUS BLD VENIPUNCTURE: CPT

## 2020-01-17 PROCEDURE — 80076 HEPATIC FUNCTION PANEL: CPT

## 2020-01-17 PROCEDURE — 2580000003 HC RX 258: Performed by: STUDENT IN AN ORGANIZED HEALTH CARE EDUCATION/TRAINING PROGRAM

## 2020-01-17 PROCEDURE — 6370000000 HC RX 637 (ALT 250 FOR IP): Performed by: STUDENT IN AN ORGANIZED HEALTH CARE EDUCATION/TRAINING PROGRAM

## 2020-01-17 PROCEDURE — G0378 HOSPITAL OBSERVATION PER HR: HCPCS

## 2020-01-17 PROCEDURE — 85379 FIBRIN DEGRADATION QUANT: CPT

## 2020-01-17 PROCEDURE — 80048 BASIC METABOLIC PNL TOTAL CA: CPT

## 2020-01-17 PROCEDURE — 6360000004 HC RX CONTRAST MEDICATION: Performed by: STUDENT IN AN ORGANIZED HEALTH CARE EDUCATION/TRAINING PROGRAM

## 2020-01-17 RX ORDER — ACETAMINOPHEN 325 MG/1
650 TABLET ORAL EVERY 4 HOURS PRN
Status: DISCONTINUED | OUTPATIENT
Start: 2020-01-17 | End: 2020-01-17 | Stop reason: HOSPADM

## 2020-01-17 RX ORDER — SODIUM CHLORIDE 0.9 % (FLUSH) 0.9 %
10 SYRINGE (ML) INJECTION PRN
Status: DISCONTINUED | OUTPATIENT
Start: 2020-01-17 | End: 2020-01-17 | Stop reason: HOSPADM

## 2020-01-17 RX ORDER — SODIUM CHLORIDE 0.9 % (FLUSH) 0.9 %
10 SYRINGE (ML) INJECTION EVERY 12 HOURS SCHEDULED
Status: DISCONTINUED | OUTPATIENT
Start: 2020-01-17 | End: 2020-01-17 | Stop reason: HOSPADM

## 2020-01-17 RX ORDER — FENTANYL CITRATE 50 UG/ML
50 INJECTION, SOLUTION INTRAMUSCULAR; INTRAVENOUS ONCE
Status: COMPLETED | OUTPATIENT
Start: 2020-01-17 | End: 2020-01-17

## 2020-01-17 RX ORDER — PANTOPRAZOLE SODIUM 40 MG/1
40 TABLET, DELAYED RELEASE ORAL
Status: DISCONTINUED | OUTPATIENT
Start: 2020-01-18 | End: 2020-01-17 | Stop reason: SDUPTHER

## 2020-01-17 RX ORDER — PANTOPRAZOLE SODIUM 40 MG/1
40 TABLET, DELAYED RELEASE ORAL
Status: DISCONTINUED | OUTPATIENT
Start: 2020-01-17 | End: 2020-01-17 | Stop reason: HOSPADM

## 2020-01-17 RX ORDER — KETOROLAC TROMETHAMINE 15 MG/ML
15 INJECTION, SOLUTION INTRAMUSCULAR; INTRAVENOUS ONCE
Status: COMPLETED | OUTPATIENT
Start: 2020-01-17 | End: 2020-01-17

## 2020-01-17 RX ORDER — MORPHINE SULFATE 4 MG/ML
4 INJECTION, SOLUTION INTRAMUSCULAR; INTRAVENOUS ONCE
Status: COMPLETED | OUTPATIENT
Start: 2020-01-17 | End: 2020-01-17

## 2020-01-17 RX ORDER — ONDANSETRON 2 MG/ML
4 INJECTION INTRAMUSCULAR; INTRAVENOUS ONCE
Status: COMPLETED | OUTPATIENT
Start: 2020-01-17 | End: 2020-01-17

## 2020-01-17 RX ORDER — MAGNESIUM SULFATE 1 G/100ML
1 INJECTION INTRAVENOUS ONCE
Status: COMPLETED | OUTPATIENT
Start: 2020-01-17 | End: 2020-01-17

## 2020-01-17 RX ORDER — ASPIRIN 81 MG/1
324 TABLET, CHEWABLE ORAL ONCE
Status: COMPLETED | OUTPATIENT
Start: 2020-01-17 | End: 2020-01-17

## 2020-01-17 RX ORDER — ACETAMINOPHEN 325 MG/1
650 TABLET ORAL EVERY 4 HOURS PRN
Status: DISCONTINUED | OUTPATIENT
Start: 2020-01-17 | End: 2020-01-17 | Stop reason: SDUPTHER

## 2020-01-17 RX ORDER — SODIUM CHLORIDE 9 MG/ML
INJECTION, SOLUTION INTRAVENOUS CONTINUOUS
Status: DISCONTINUED | OUTPATIENT
Start: 2020-01-17 | End: 2020-01-17

## 2020-01-17 RX ORDER — ONDANSETRON 2 MG/ML
4 INJECTION INTRAMUSCULAR; INTRAVENOUS EVERY 6 HOURS PRN
Status: DISCONTINUED | OUTPATIENT
Start: 2020-01-17 | End: 2020-01-17 | Stop reason: HOSPADM

## 2020-01-17 RX ORDER — PREGABALIN 100 MG/1
200 CAPSULE ORAL 2 TIMES DAILY
Status: DISCONTINUED | OUTPATIENT
Start: 2020-01-17 | End: 2020-01-17 | Stop reason: HOSPADM

## 2020-01-17 RX ORDER — NITROGLYCERIN 0.4 MG/1
0.4 TABLET SUBLINGUAL ONCE
Status: COMPLETED | OUTPATIENT
Start: 2020-01-17 | End: 2020-01-17

## 2020-01-17 RX ORDER — 0.9 % SODIUM CHLORIDE 0.9 %
1000 INTRAVENOUS SOLUTION INTRAVENOUS ONCE
Status: COMPLETED | OUTPATIENT
Start: 2020-01-17 | End: 2020-01-17

## 2020-01-17 RX ORDER — POTASSIUM CHLORIDE 20 MEQ/1
40 TABLET, EXTENDED RELEASE ORAL ONCE
Status: COMPLETED | OUTPATIENT
Start: 2020-01-17 | End: 2020-01-17

## 2020-01-17 RX ADMIN — IOHEXOL 100 ML: 350 INJECTION, SOLUTION INTRAVENOUS at 05:46

## 2020-01-17 RX ADMIN — MORPHINE SULFATE 4 MG: 4 INJECTION INTRAVENOUS at 04:12

## 2020-01-17 RX ADMIN — LIDOCAINE HYDROCHLORIDE: 20 SOLUTION ORAL; TOPICAL at 09:50

## 2020-01-17 RX ADMIN — FENTANYL CITRATE 50 MCG: 50 INJECTION, SOLUTION INTRAMUSCULAR; INTRAVENOUS at 04:41

## 2020-01-17 RX ADMIN — ASPIRIN 81 MG 324 MG: 81 TABLET ORAL at 03:54

## 2020-01-17 RX ADMIN — SODIUM CHLORIDE: 9 INJECTION, SOLUTION INTRAVENOUS at 12:03

## 2020-01-17 RX ADMIN — POTASSIUM CHLORIDE 40 MEQ: 1500 TABLET, EXTENDED RELEASE ORAL at 12:42

## 2020-01-17 RX ADMIN — MAGNESIUM SULFATE HEPTAHYDRATE 1 G: 1 INJECTION, SOLUTION INTRAVENOUS at 12:42

## 2020-01-17 RX ADMIN — PANTOPRAZOLE SODIUM 40 MG: 40 TABLET, DELAYED RELEASE ORAL at 09:50

## 2020-01-17 RX ADMIN — KETOROLAC TROMETHAMINE 15 MG: 15 INJECTION, SOLUTION INTRAMUSCULAR; INTRAVENOUS at 05:33

## 2020-01-17 RX ADMIN — NITROGLYCERIN 0.4 MG: 0.4 TABLET SUBLINGUAL at 03:54

## 2020-01-17 RX ADMIN — PREGABALIN 200 MG: 100 CAPSULE ORAL at 12:10

## 2020-01-17 RX ADMIN — ACETAMINOPHEN 650 MG: 325 TABLET ORAL at 06:41

## 2020-01-17 RX ADMIN — ONDANSETRON 4 MG: 2 INJECTION INTRAMUSCULAR; INTRAVENOUS at 12:15

## 2020-01-17 RX ADMIN — ONDANSETRON 4 MG: 2 INJECTION INTRAMUSCULAR; INTRAVENOUS at 04:00

## 2020-01-17 RX ADMIN — SODIUM CHLORIDE 1000 ML: 9 INJECTION, SOLUTION INTRAVENOUS at 06:12

## 2020-01-17 RX ADMIN — FENTANYL CITRATE 50 MCG: 50 INJECTION, SOLUTION INTRAMUSCULAR; INTRAVENOUS at 12:04

## 2020-01-17 ASSESSMENT — ENCOUNTER SYMPTOMS
ABDOMINAL PAIN: 0
NAUSEA: 1
WHEEZING: 0
DIARRHEA: 0
VOMITING: 0
SHORTNESS OF BREATH: 0
COUGH: 0

## 2020-01-17 ASSESSMENT — PAIN DESCRIPTION - ORIENTATION: ORIENTATION: RIGHT

## 2020-01-17 ASSESSMENT — PAIN SCALES - GENERAL
PAINLEVEL_OUTOF10: 8
PAINLEVEL_OUTOF10: 4
PAINLEVEL_OUTOF10: 8
PAINLEVEL_OUTOF10: 5
PAINLEVEL_OUTOF10: 4

## 2020-01-17 ASSESSMENT — PAIN DESCRIPTION - PAIN TYPE
TYPE: ACUTE PAIN;VISCERAL PAIN
TYPE: ACUTE PAIN

## 2020-01-17 ASSESSMENT — PAIN DESCRIPTION - LOCATION: LOCATION: CHEST;BACK

## 2020-01-17 NOTE — ED PROVIDER NOTES
[facility]     Emergency Department     Faculty Attestation    I performed a history and physical examination of the patient and discussed management with the resident. I reviewed the residents note and agree with the documented findings and plan of care. Any areas of disagreement are noted on the chart. I was personally present for the key portions of any procedures. I have documented in the chart those procedures where I was not present during the key portions. I have reviewed the emergency nurses triage note. I agree with the chief complaint, past medical history, past surgical history, allergies, medications, social and family history as documented unless otherwise noted below. For Physician Assistant/ Nurse Practitioner cases/documentation I have personally evaluated this patient and have completed at least one if not all key elements of the E/M (history, physical exam, and MDM). Additional findings are as noted. Primary Care Physician:  Chevy Vasquez DO    CHIEF COMPLAINT     No chief complaint on file. RECENT VITALS:    ,   ,  ,      LABS:  Labs Reviewed - No data to display    Radiology  No orders to display         Attending Physician Additional  Notes    The patient is a 69-year-old female with history of obesity, gastritis, and anxiety who presents for evaluation of chest pain gastric abdominal pain. The patient reports that she woke up from sleep tonight at 1:45 AM with sudden onset of sharp, stabbing, epigastric abdominal pain that radiates to her mid chest and around the right side of her chest wall to her back. She also complains of associated nausea but denies any vomiting, diaphoresis, shortness of breath, lightheadedness, dizziness, focal weakness, numbness or tingling. She tried taking Tums and Gas-X without relief. She does not list any provoking or palliating factors.   The patient denies any personal or family history of cardiac disease or blood clots.  She does have history of chronic gastritis for which she takes omeprazole. The patient also reports that she had E. coli food poisoning approximately 1 week ago and was treated with antibiotics. The patient denies fever, chills, headache, vision changes, neck pain, urinary/bowel symptoms, recent injury or history of stress test.  Vital signs are stable except for slightly elevated blood pressure. Heart regular rate and rhythm. Lungs clear to auscultation. Abdomen soft with epigastric tenderness palpation. No pain over McBurney's point. Negative Kim sign. No midline spinal tenderness palpation, step-off or deformity. No CVA tenderness. Radial pulses 2/4 and equal bilaterally. Capillary refill less than 2 seconds. Mucous membranes moist.  Plan to obtain cardiac work-up, d-dimer, chest x-ray and treat pain.     EKG 3:44 AM sinus rhythm, normal axis, normal intervals, no ST elevation or depression, T wave flattening in 3,8 poor R wave progression, Q wave in 3, no change from EKG on 6/30/2016      Jocy Benitez DO  Attending Emergency Physician            Jocy Benitez DO  01/17/20 1118

## 2020-01-17 NOTE — ED PROVIDER NOTES
Constitutional: Negative for chills and fever. HENT: Negative. Eyes: Negative for visual disturbance. Respiratory: Negative for cough, shortness of breath and wheezing. Cardiovascular: Positive for chest pain. Negative for palpitations and leg swelling. Gastrointestinal: Positive for nausea. Negative for abdominal pain, diarrhea and vomiting. Genitourinary: Negative for dysuria and hematuria. Musculoskeletal: Negative for neck pain and neck stiffness. Skin: Negative for rash and wound. Neurological: Negative for dizziness, syncope, weakness, light-headedness, numbness and headaches. PHYSICAL EXAM   (up to 7 for level 4, 8 or more forlevel 5)      INITIAL VITALS:   Vitals:    01/17/20 0431 01/17/20 0800 01/17/20 1116 01/17/20 1117   BP: (!) 146/92 (!) 140/89  (!) 144/78   Pulse: 79 82  80   Resp: 11 16  16   Temp:  98.6 °F (37 °C)  98.6 °F (37 °C)   TempSrc:  Oral  Oral   SpO2: 97% 100%  96%   Weight:   254 lb 9.6 oz (115.5 kg)    Height:   5' 2\" (1.575 m)            Physical Exam  Vitals signs and nursing note reviewed. Constitutional:       General: She is not in acute distress. Appearance: Normal appearance. She is not ill-appearing, toxic-appearing or diaphoretic. HENT:      Head: Normocephalic and atraumatic. Cardiovascular:      Rate and Rhythm: Normal rate and regular rhythm. Pulses: Normal pulses. Heart sounds: No murmur. No gallop. Comments: 2+ pulses throughout all 4 extremities. Pulmonary:      Effort: Pulmonary effort is normal. No respiratory distress. Breath sounds: Normal breath sounds. No stridor. No wheezing, rhonchi or rales. Abdominal:      General: There is no distension. Palpations: Abdomen is soft. There is no mass. Tenderness: There is tenderness. There is no guarding or rebound. Hernia: No hernia is present. Comments: Epigastric tenderness without peritoneal signs. No Kim sign.    Musculoskeletal:      Right lower leg: No edema. Left lower leg: No edema. Skin:     General: Skin is warm and dry. Findings: No rash. Neurological:      Mental Status: She is alert.          DIFFERENTIAL  DIAGNOSIS     PLAN (LABS / IMAGING / EKG):  Orders Placed This Encounter   Procedures    XR CHEST STANDARD (2 VW)    CTA CHEST W WO CONTRAST    CTA ABDOMEN PELVIS W CONTRAST    US GALLBLADDER RUQ    US RETROPERITONEAL COMPLETE    CBC Auto Differential    BASIC METABOLIC PANEL    Troponin    Troponin    D-Dimer, Quantitative    HEPATIC FUNCTION PANEL    LIPASE    CBC WITH AUTO DIFFERENTIAL    Comprehensive Metabolic Panel    LIPASE    Troponin    Inpatient consult to Cardiology    EKG 12 Lead    EKG 12 Lead    EKG 12 Lead    EKG REPORT    EKG REPORT    PATIENT STATUS (FROM ED OR OR/PROCEDURAL) Observation    Discharge patient       MEDICATIONS ORDERED:  Orders Placed This Encounter   Medications    aspirin chewable tablet 324 mg    nitroGLYCERIN (NITROSTAT) SL tablet 0.4 mg    ondansetron (ZOFRAN) injection 4 mg    morphine injection 4 mg    fentaNYL (SUBLIMAZE) injection 50 mcg    ketorolac (TORADOL) injection 15 mg    iohexol (OMNIPAQUE 350) solution 100 mL    0.9 % sodium chloride bolus    DISCONTD: pregabalin (LYRICA) capsule 200 mg    DISCONTD: pantoprazole (PROTONIX) tablet 40 mg    DISCONTD: sodium chloride flush 0.9 % injection 10 mL    DISCONTD: sodium chloride flush 0.9 % injection 10 mL    DISCONTD: acetaminophen (TYLENOL) tablet 650 mg    DISCONTD: enoxaparin (LOVENOX) injection 40 mg    DISCONTD: 0.9 % sodium chloride infusion    DISCONTD: acetaminophen (TYLENOL) tablet 650 mg    DISCONTD: ondansetron (ZOFRAN) injection 4 mg    DISCONTD: pantoprazole (PROTONIX) tablet 40 mg    aluminum & magnesium hydroxide-simethicone (MAALOX) 30 mL, lidocaine viscous hcl (XYLOCAINE) 5 mL (GI COCKTAIL)    fentaNYL (SUBLIMAZE) injection 50 mcg    potassium chloride (KLOR-CON M) extended release tablet 40 mEq    magnesium sulfate 1 g in dextrose 5% 100 mL IVPB    DISCONTD: enoxaparin (LOVENOX) injection 30 mg       DDX: dissection, PE, ACS, gastritis, PUD, Cholelithiasis, choledocolithiasis, nephrolithiasis, constipation    Initial MDM/Plan/ED course: 38 y.o. female who presents with epigastric and midsternal chest pain. On exam vitals are normal except for HTN, and patient is in no acute distress but does appear in pain. PE is notable for epigastic tenderness without peritoneal signs. 2+ pulses in all extremitites, no focal neuro deficits. Heart and lung sounds normal. Patient does complain of sharp pain radiating around to her back, this as well as presenting with HTN was concerning for potential dissection or PE. Cardiac w/u, abdominal labs, and d-dimer obtained. Pt found to have elevated dimer and midly elevated lipase but was otherwise unremarkable. CTA obtained to r/o dissection or PE. Pain treated with morphine and fentanyl. POC ultrasound at bedside reveals cholelithiasis without evidence of cholecysitits. Signed out to Dr. Simone Shrestha discussed history, physical, work up and plan with him. Please see his note for further information.        DIAGNOSTIC RESULTS / EMERGENCY DEPARTMENT COURSE / MDM     LABS:  Labs Reviewed   CBC WITH AUTO DIFFERENTIAL - Abnormal; Notable for the following components:       Result Value    WBC 14.4 (*)     Lymphocytes 22 (*)     Immature Granulocytes 1 (*)     Segs Absolute 9.49 (*)     Absolute Mono # 1.31 (*)     All other components within normal limits   BASIC METABOLIC PANEL - Abnormal; Notable for the following components:    Glucose 104 (*)     CREATININE 0.93 (*)     Potassium 3.5 (*)     All other components within normal limits   HEPATIC FUNCTION PANEL - Abnormal; Notable for the following components:    ALT 36 (*)     All other components within normal limits   LIPASE - Abnormal; Notable for the following components:    Lipase 114 (*)     All

## 2020-01-17 NOTE — PLAN OF CARE
Patient ok for d/c with 2 negative troponins. EKG reviewed by Resident and states patient can be d/c and f/u outpatient with Gen SX for possible cholecystectomy.

## 2020-01-17 NOTE — ED TRIAGE NOTES
Pt to ED with right sided chest/rib pain radiating to upper right back since woken up from pain around 0145 today. Pt had tenderness upon palpation in epigastric area. Pt had one episode of vomiting this morning. Denies hx of this specific pain. Pt frequently had indigestion and heart burn, but states this pain is \"very different. \"  Pt is hypertensive on arrival at 175/120, states she normally has a normal BP but gets hypertensive when in pain, per pt. Pt on full cardiac monitor, EKG obtained, IV inserted and labs sent to lab via tube system. Pt has call light and bed in lowest position. Will continue to monitor.

## 2020-01-17 NOTE — ED PROVIDER NOTES
[facility] ED  Emergency Department  Emergency Medicine Resident Sign-out     Care of Meade Mcardle was assumed from Dr. Camacho Corrales and is being seen for Chest Pain (right sided) and Back Pain (upper right)  . The patient's initial evaluation and plan have been discussed with the prior provider who initially evaluated the patient.      EMERGENCY DEPARTMENT COURSE / MEDICAL DECISION MAKING:       MEDICATIONS GIVEN:  Orders Placed This Encounter   Medications    aspirin chewable tablet 324 mg    nitroGLYCERIN (NITROSTAT) SL tablet 0.4 mg    ondansetron (ZOFRAN) injection 4 mg    morphine injection 4 mg    fentaNYL (SUBLIMAZE) injection 50 mcg    ketorolac (TORADOL) injection 15 mg    iohexol (OMNIPAQUE 350) solution 100 mL    0.9 % sodium chloride bolus       LABS / RADIOLOGY:     Labs Reviewed   CBC WITH AUTO DIFFERENTIAL - Abnormal; Notable for the following components:       Result Value    WBC 14.4 (*)     Lymphocytes 22 (*)     Immature Granulocytes 1 (*)     Segs Absolute 9.49 (*)     Absolute Mono # 1.31 (*)     All other components within normal limits   BASIC METABOLIC PANEL - Abnormal; Notable for the following components:    Glucose 104 (*)     CREATININE 0.93 (*)     Potassium 3.5 (*)     All other components within normal limits   HEPATIC FUNCTION PANEL - Abnormal; Notable for the following components:    ALT 36 (*)     All other components within normal limits   LIPASE - Abnormal; Notable for the following components:    Lipase 114 (*)     All other components within normal limits   TROPONIN   TROPONIN   D-DIMER, QUANTITATIVE       Xr Chest Standard (2 Vw)    Result Date: 1/17/2020  EXAMINATION: TWO XRAY VIEWS OF THE CHEST 1/17/2020 4:30 am COMPARISON: Chest radiograph 04/05/2015 HISTORY: ORDERING SYSTEM PROVIDED HISTORY: CP TECHNOLOGIST PROVIDED HISTORY: CP Reason for Exam: upper right epigastric pain and pain in back Acuity: Acute Type of Exam: Initial FINDINGS: Normal

## 2020-01-17 NOTE — H&P
[facility]  CDU / OBSERVATION eNCOUnter  Resident Note     Pt Name: Anibal Koyanagi  MRN: [id number]  [place] 1975  Date of evaluation: 1/17/20  Patient's PCP is :  Zaria Hoff DO    CHIEF COMPLAINT       Chief Complaint   Patient presents with    Chest Pain     right sided    Back Pain     upper right         HISTORY OF PRESENT ILLNESS    Anibal Koyanagi is a 38 y.o. female who presents with a complaint of chest pain to the midsternal region associated epigastric pain as well patient states that this is associated similarly with nausea and radiation to her back denies any diaphoresis shortness of breath or emesis patient has a history of hypertension. Tums are unrelieved by Tums and Gas-X. Has history of chronic osteoarthritis for which he takes omeprazole daily. States that she was treated for E. coli food poisoning approximately 1 week ago with antibiotics. Serial troponins were then 6. D-dimer was positive 1.25. Subsequently underwent CT imaging which demonstrated no acute abnormality of the thoracic or abdominal aorta no pulmonary embolism no acute inflammatory process of the chest abdomen or pelvis hepatic steatosis was identified as well as cholelithiasis.   A 5.2 cm left ovarian cyst was similarly identified and patient should undergo an ultrasound in approximately 6 to 12 weeks for follow-up    Location/Symptom: substernal chest pain  Timing/Onset: acute onset  Provocation: unclear  Quality: sharp  Radiation: to back  Severity: severe  Timing/Duration: constant    Modifying Factors: per hpi    REVIEW OF SYSTEMS       General ROS - No fevers, No malaise   Ophthalmic ROS - No discharge, No changes in vision  ENT ROS -  No sore throat, No rhinorrhea,   Respiratory ROS - no shortness of breath, no cough, no  wheezing  Cardiovascular ROS - +chest pain, no dyspnea on exertion  Gastrointestinal ROS - +abdominal pain, +nausea NO vomiting, no change in bowel habits, abnormality. No acute cardiopulmonary process. Cta Chest W Wo Contrast    No acute abnormality of the thoracic or abdominal aorta. No pulmonary embolism. No acute inflammatory process in the chest, abdomen, or pelvis. Hepatic steatosis. Cholelithiasis. 5.2 cm left ovarian cyst.  Follow-up ultrasound is recommended in 6-12 weeks. Cta Abdomen Pelvis W Contrast    No acute abnormality of the thoracic or abdominal aorta. No pulmonary embolism. No acute inflammatory process in the chest, abdomen, or pelvis. Hepatic steatosis. Cholelithiasis. 5.2 cm left ovarian cyst.  Follow-up ultrasound is recommended in 6-12 weeks. LABS:  I have reviewed and interpreted all available lab results.   Labs Reviewed   CBC WITH AUTO DIFFERENTIAL - Abnormal; Notable for the following components:       Result Value    WBC 14.4 (*)     Lymphocytes 22 (*)     Immature Granulocytes 1 (*)     Segs Absolute 9.49 (*)     Absolute Mono # 1.31 (*)     All other components within normal limits   BASIC METABOLIC PANEL - Abnormal; Notable for the following components:    Glucose 104 (*)     CREATININE 0.93 (*)     Potassium 3.5 (*)     All other components within normal limits   HEPATIC FUNCTION PANEL - Abnormal; Notable for the following components:    ALT 36 (*)     All other components within normal limits   LIPASE - Abnormal; Notable for the following components:    Lipase 114 (*)     All other components within normal limits   TROPONIN   TROPONIN   D-DIMER, QUANTITATIVE           SCREENING TOOLS:  HEART Risk Score for Chest Pain Patients   History and Physical Exam Suspicion Level  (Nausea, Vomiting, Diaphoresis, Radiation, Exertion)   Slightly Suspicious (0 pts)   Moderately Suspicious (1 pt)   Highly Suspicious (2 pts)   EKG Interpretation   Normal (0 pts)   Non-Specific Repolarization Disturbance (1 pt)   Significant ST-Depression (2 pts)   Age of Patient (in years)   = 45 (0 pts)   46-64 (1 pt)   = 65 (2 pts)   Risk

## 2020-01-17 NOTE — PROGRESS NOTES
CDU Daily Progress Note  Attending Physician       Pt Name: Caryn Smiley  MRN: [id number]  [place] 1975  Date of evaluation: 1/17/20    I performed a history and physical examination of the patient and discussed management with the resident. I reviewed the residents note and agree with the documented findings and plan of care. Any areas of disagreement are noted on the chart. I was personally present for the key portions of any procedures. I have documented in the chart those procedures where I was not present during the key portions. I have reviewed the emergency nurses triage note. I agree with the chief complaint, past medical history, past surgical history, allergies, medications, social and family history as documented unless otherwise noted below. Documentation of the HPI, Physical Exam and Medical Decision Making performed by medical students or scribes is based on my personal performance of the HPI, PE and MDM. For Physician Assistant/ Nurse Practitioner cases/documentation I have personally evaluated this patient and have completed at least one if not all key elements of the E/M (history, physical exam, and MDM). Additional findings are as noted. The Family History, Social History and Review of Systems are unchanged from the previous day. No significant events overnight. Not diabetic. Nonsmoker. RUQ US shows gallstones, but no acute cholelithiasis. Pain is RUQ area. No chest pain currently. Intermittent nausea. Will repeat troponin and EKG. If negative, possibly discharge today.       Leo Rogers MD  Attending Physician  Critical Decision Unit

## 2020-01-17 NOTE — ED PROVIDER NOTES
FACULTY SIGN-OUT  ADDENDUM       Patient: Meade Mcardle   MRN: [id number]  PCP:  Pepito Rudd, DO  The patient's initial evaluation and plan have been discussed with the prior provider who initially evaluated the patient. Nursing Notes, Past Medical Hx, Past Surgical Hx, Social Hx, Allergies, and Family Hx were all reviewed. Pertinent Comments: The patient is a 38 y.o. female taken in signout with chest pain and epigastric pain going to the back with CT of the chest/abdomen/pelvis negative for dissection or other process such as PE or intra-abdominal process except for finding of gallstones. No obvious pericholecystic fluid.       We are awaiting admission to observation unit for gallbladder ultrasound and symptomatic control    ED COURSE      The patient was given the following medications:  Orders Placed This Encounter   Medications    aspirin chewable tablet 324 mg    nitroGLYCERIN (NITROSTAT) SL tablet 0.4 mg    ondansetron (ZOFRAN) injection 4 mg    morphine injection 4 mg    fentaNYL (SUBLIMAZE) injection 50 mcg    ketorolac (TORADOL) injection 15 mg    iohexol (OMNIPAQUE 350) solution 100 mL    0.9 % sodium chloride bolus    acetaminophen (TYLENOL) tablet 650 mg    ondansetron (ZOFRAN) injection 4 mg       RECENT VITALS:   BP: (!) 146/92  Pulse: 79  Resp: 11  Temp: 98.8 °F (37.1 °C) SpO2: 97 %    (Please note that portions of this note were completed with a voice recognition program.  Efforts were made to edit the dictations but occasionally words are mis-transcribed.)    MD Carl Casarez  Attending Emergency Medicine Physician        Kelly Colindres MD  01/17/20 5555

## 2020-01-18 LAB
EKG ATRIAL RATE: 71 BPM
EKG ATRIAL RATE: 81 BPM
EKG P AXIS: 34 DEGREES
EKG P AXIS: 52 DEGREES
EKG P-R INTERVAL: 158 MS
EKG P-R INTERVAL: 158 MS
EKG Q-T INTERVAL: 396 MS
EKG Q-T INTERVAL: 422 MS
EKG QRS DURATION: 94 MS
EKG QRS DURATION: 96 MS
EKG QTC CALCULATION (BAZETT): 458 MS
EKG QTC CALCULATION (BAZETT): 460 MS
EKG R AXIS: 19 DEGREES
EKG R AXIS: 29 DEGREES
EKG T AXIS: 25 DEGREES
EKG T AXIS: 35 DEGREES
EKG VENTRICULAR RATE: 71 BPM
EKG VENTRICULAR RATE: 81 BPM

## 2020-01-18 PROCEDURE — 93010 ELECTROCARDIOGRAM REPORT: CPT | Performed by: INTERNAL MEDICINE

## 2020-01-18 NOTE — DISCHARGE SUMMARY
CDU Discharge Summary        Patient:  Anibal Koyanagi  YOB: 1975    MRN: [id number]   Acct: [de-identified]    Primary Care Physician: Zaria Hoff DO    Admit date:  1/17/2020  3:37 AM  Discharge date: 1/17/2020  6:46 PM     Discharge Diagnoses:     1.  acute onset substernal chest pain,initial encounter, unclear etiology  Patient was seen and evaluated serial troponins and serial twelve-lead ECGs were unchanged patient providedAcetaminophen  650 mg p.o. every 4 hours as needed pain and zofran 4mg IV every 6 hours as needed nausea/vomiting     2. Acute onset right upper quadrant abdominal pain, initial encounter, unclear etiology  +saeed sign on exam and a right upper quadrant ultrasound was ordered which showed Cholelithiasis without sonographic evidence of acute cholecystitis. Fatty infiltration of the liver. cholelithiasis was similarly noted on ct. the patient was provided GI cocktail po viscous lidocaine, maalox with improvement of her symptoms    Follow-up:  Call today/tomorrow for a follow up appointment with Zaria Hoff DO , or return to the Emergency Room with worsening symptoms    Stressed to patient the importance of following up with primary care doctor for further workup/management of symptoms. Pt verbalizes understanding and agrees with plan. Discharge Medication Changes:       Medication List      CONTINUE taking these medications    lansoprazole 30 MG delayed release capsule  Commonly known as:  PREVACID  TAKE ONE CAPSULE BY MOUTH DAILY     LYRICA 200 MG capsule  Generic drug:  pregabalin     promethazine 12.5 MG tablet  Commonly known as:  PHENERGAN     tiZANidine 4 MG tablet  Commonly known as:  ZANAFLEX            Diet:  No diet orders on file, advance as tolerated     Activity:  As tolerated    Consultants: IP CONSULT TO CARDIOLOGY    Procedures:  Ct abdomen/pelvis, ruq ultrasound.     Diagnostic Test:   Results for orders placed or performed during the hospital encounter of 01/17/20   CBC Auto Differential   Result Value Ref Range    WBC 14.4 (H) 3.5 - 11.3 k/uL    RBC 4.31 3.95 - 5.11 m/uL    Hemoglobin 12.8 11.9 - 15.1 g/dL    Hematocrit 39.7 36.3 - 47.1 %    MCV 92.1 82.6 - 102.9 fL    MCH 29.7 25.2 - 33.5 pg    MCHC 32.2 28.4 - 34.8 g/dL    RDW 13.0 11.8 - 14.4 %    Platelets 593 590 - 102 k/uL    MPV 10.9 8.1 - 13.5 fL    NRBC Automated 0.0 0.0 per 100 WBC    Differential Type NOT REPORTED     Seg Neutrophils 65 36 - 65 %    Lymphocytes 22 (L) 24 - 43 %    Monocytes 9 3 - 12 %    Eosinophils % 2 1 - 4 %    Basophils 1 0 - 2 %    Immature Granulocytes 1 (H) 0 %    Segs Absolute 9.49 (H) 1.50 - 8.10 k/uL    Absolute Lymph # 3.12 1.10 - 3.70 k/uL    Absolute Mono # 1.31 (H) 0.10 - 1.20 k/uL    Absolute Eos # 0.22 0.00 - 0.44 k/uL    Basophils Absolute 0.08 0.00 - 0.20 k/uL    Absolute Immature Granulocyte 0.20 0.00 - 0.30 k/uL    WBC Morphology NOT REPORTED     RBC Morphology NOT REPORTED     Platelet Estimate NOT REPORTED    BASIC METABOLIC PANEL   Result Value Ref Range    Glucose 104 (H) 70 - 99 mg/dL    BUN 10 6 - 20 mg/dL    CREATININE 0.93 (H) 0.50 - 0.90 mg/dL    Bun/Cre Ratio NOT REPORTED 9 - 20    Calcium 8.6 8.6 - 10.4 mg/dL    Sodium 136 135 - 144 mmol/L    Potassium 3.5 (L) 3.7 - 5.3 mmol/L    Chloride 99 98 - 107 mmol/L    CO2 24 20 - 31 mmol/L    Anion Gap 13 9 - 17 mmol/L    GFR Non-African American >60 >60 mL/min    GFR African American >60 >60 mL/min    GFR Comment          GFR Staging NOT REPORTED    Troponin   Result Value Ref Range    Troponin, High Sensitivity <6 0 - 14 ng/L    Troponin T NOT REPORTED <0.03 ng/mL    Troponin Interp NOT REPORTED    Troponin   Result Value Ref Range    Troponin, High Sensitivity <6 0 - 14 ng/L    Troponin T NOT REPORTED <0.03 ng/mL    Troponin Interp NOT REPORTED    D-Dimer, Quantitative   Result Value Ref Range    D-Dimer, Quant 1.25 mg/L FEU   HEPATIC FUNCTION PANEL   Result Value Ref Range    Alb 4.0 3.5 - 5.2 g/dL Alkaline Phosphatase 64 35 - 104 U/L    ALT 36 (H) 5 - 33 U/L    AST 27 <32 U/L    Total Bilirubin 0.45 0.3 - 1.2 mg/dL    Bilirubin, Direct 0.15 <0.31 mg/dL    Bilirubin, Indirect 0.30 0.00 - 1.00 mg/dL    Total Protein 7.2 6.4 - 8.3 g/dL    Globulin NOT REPORTED 1.5 - 3.8 g/dL    Albumin/Globulin Ratio 1.3 1.0 - 2.5   LIPASE   Result Value Ref Range    Lipase 114 (H) 13 - 60 U/L   CBC WITH AUTO DIFFERENTIAL   Result Value Ref Range    WBC 11.1 3.5 - 11.3 k/uL    RBC 4.05 3.95 - 5.11 m/uL    Hemoglobin 12.0 11.9 - 15.1 g/dL    Hematocrit 37.6 36.3 - 47.1 %    MCV 92.8 82.6 - 102.9 fL    MCH 29.6 25.2 - 33.5 pg    MCHC 31.9 28.4 - 34.8 g/dL    RDW 13.0 11.8 - 14.4 %    Platelets 171 699 - 274 k/uL    MPV 10.7 8.1 - 13.5 fL    NRBC Automated 0.0 0.0 per 100 WBC    Differential Type NOT REPORTED     Seg Neutrophils 65 36 - 65 %    Lymphocytes 23 (L) 24 - 43 %    Monocytes 8 3 - 12 %    Eosinophils % 2 1 - 4 %    Basophils 1 0 - 2 %    Immature Granulocytes 1 (H) 0 %    Segs Absolute 7.26 1.50 - 8.10 k/uL    Absolute Lymph # 2.52 1.10 - 3.70 k/uL    Absolute Mono # 0.92 0.10 - 1.20 k/uL    Absolute Eos # 0.19 0.00 - 0.44 k/uL    Basophils Absolute 0.08 0.00 - 0.20 k/uL    Absolute Immature Granulocyte 0.12 0.00 - 0.30 k/uL    WBC Morphology NOT REPORTED     RBC Morphology NOT REPORTED     Platelet Estimate NOT REPORTED    Comprehensive Metabolic Panel   Result Value Ref Range    Glucose 75 70 - 99 mg/dL    BUN 8 6 - 20 mg/dL    CREATININE 0.69 0.50 - 0.90 mg/dL    Bun/Cre Ratio NOT REPORTED 9 - 20    Calcium 8.4 (L) 8.6 - 10.4 mg/dL    Sodium 139 135 - 144 mmol/L    Potassium 3.4 (L) 3.7 - 5.3 mmol/L    Chloride 104 98 - 107 mmol/L    CO2 23 20 - 31 mmol/L    Anion Gap 12 9 - 17 mmol/L    Alkaline Phosphatase 54 35 - 104 U/L    ALT 31 5 - 33 U/L    AST 20 <32 U/L    Total Bilirubin 0.65 0.3 - 1.2 mg/dL    Total Protein 6.3 (L) 6.4 - 8.3 g/dL    Alb 3.5 3.5 - 5.2 g/dL    Albumin/Globulin Ratio 1.3 1.0 - 2.5    GFR Non-African American >60 >60 mL/min    GFR African American >60 >60 mL/min    GFR Comment          GFR Staging NOT REPORTED    LIPASE   Result Value Ref Range    Lipase 50 13 - 60 U/L   Troponin   Result Value Ref Range    Troponin, High Sensitivity <6 0 - 14 ng/L    Troponin T NOT REPORTED <0.03 ng/mL    Troponin Interp NOT REPORTED    EKG 12 Lead   Result Value Ref Range    Ventricular Rate 89 BPM    Atrial Rate 89 BPM    P-R Interval 142 ms    QRS Duration 88 ms    Q-T Interval 382 ms    QTc Calculation (Bazett) 464 ms    P Axis 38 degrees    R Axis 22 degrees    T Axis 30 degrees   EKG 12 Lead   Result Value Ref Range    Ventricular Rate 71 BPM    Atrial Rate 71 BPM    P-R Interval 158 ms    QRS Duration 96 ms    Q-T Interval 422 ms    QTc Calculation (Bazett) 458 ms    P Axis 34 degrees    R Axis 19 degrees    T Axis 25 degrees   EKG 12 Lead   Result Value Ref Range    Ventricular Rate 81 BPM    Atrial Rate 81 BPM    P-R Interval 158 ms    QRS Duration 94 ms    Q-T Interval 396 ms    QTc Calculation (Bazett) 460 ms    P Axis 52 degrees    R Axis 29 degrees    T Axis 35 degrees     Xr Chest Standard (2 Vw)    Result Date: 1/17/2020  EXAMINATION: TWO XRAY VIEWS OF THE CHEST 1/17/2020 4:30 am COMPARISON: Chest radiograph 04/05/2015 HISTORY: ORDERING SYSTEM PROVIDED HISTORY: CP TECHNOLOGIST PROVIDED HISTORY: CP Reason for Exam: upper right epigastric pain and pain in back Acuity: Acute Type of Exam: Initial FINDINGS: Normal heart size. Normal pulmonary vascularity. There is no focal consolidation. No evidence of pleural effusion or pneumothorax. No acute osseous abnormality. No acute cardiopulmonary process.      Cta Chest W Wo Contrast    Result Date: 1/17/2020  EXAMINATION: CTA OF THE CHEST WITH AND WITHOUT CONTRAST; CTA OF THE ABDOMEN AND PELVIS WITH CONTRAST 1/17/2020 5:57 am TECHNIQUE: CTA of the chest, abdomen and pelvis was performed before and after the administration of intravenous contrast. 5.2 cm homogeneous left ovarian cystic lesion. No free fluid in the pelvis. Bones/soft tissues:     No acute abnormality of the thoracic or abdominal aorta. No pulmonary embolism. No acute inflammatory process in the chest, abdomen, or pelvis. Hepatic steatosis. Cholelithiasis. 5.2 cm left ovarian cyst.  Follow-up ultrasound is recommended in 6-12 weeks. Us Gallbladder Ruq    Result Date: 1/17/2020  EXAMINATION: RIGHT UPPER QUADRANT ULTRASOUND 1/17/2020 8:13 am COMPARISON: None. HISTORY: ORDERING SYSTEM PROVIDED HISTORY: +kim sign, ruq pain TECHNOLOGIST PROVIDED HISTORY: +kim sign, ruq pain FINDINGS: LIVER:  Fatty infiltration of the liver. No focal mass or intrahepatic bile duct dilatation. BILIARY SYSTEM:  Cholelithiasis. No gallbladder wall thickening or pericholecystic fluid. Not for reports a negative Kim's sign. Common bile duct is within normal limits measuring 3 mm. RIGHT KIDNEY: No hydronephrosis. PANCREAS:  Not visualized. OTHER: No evidence of right upper quadrant ascites. 1. Cholelithiasis without sonographic evidence of acute cholecystitis. 2. Fatty infiltration of the liver. Cta Abdomen Pelvis W Contrast    Result Date: 1/17/2020  EXAMINATION: CTA OF THE CHEST WITH AND WITHOUT CONTRAST; CTA OF THE ABDOMEN AND PELVIS WITH CONTRAST 1/17/2020 5:57 am TECHNIQUE: CTA of the chest, abdomen and pelvis was performed before and after the administration of intravenous contrast.  Multiplanar reformatted images are provided for review. MIP images are provided for review. Dose modulation, iterative reconstruction, and/or weight based adjustment of the mA/kV was utilized to reduce the radiation dose to as low as reasonably achievable. COMPARISON: CTA chest 04/05/2015.   CT abdomen and pelvis 12/30/2013 HISTORY: ORDERING SYSTEM PROVIDED HISTORY: concern for dissection vs PE TECHNOLOGIST PROVIDED HISTORY: concern for dissection vs PE Reason for Exam: Pt to ED with right sided chest/rib pain focal motor deficit, sensory loss  Extremities - Cap refil <2 sec in all ext., no edema  Skin -warm, dry      Hospital Course:  Clinical course has improved, labs and imaging reviewed. Mitch Villafuerte originally presented to the hospital on 1/17/2020  3:37 AM with acute onset substernal chest pain,initial encounter, unclear etiology and Acute onset right upper quadrant abdominal pain, initial encounter, unclear etiology . At that time it was determined that She required further observation and testing and consultation. Labs and imaging were followed daily. Imaging results as above. She is medically stable to be discharged. Disposition: Home    Patient stated that they will not drive themselves home from the hospital if they have gotten pain killers/ narcotics earlier that day and that they will arrange for transportation on their own or work with the  for a ride. Patient counseled NOT to drive while under the influence of narcotics/ pain killers. Condition: Good    Patient stable and ready for discharge home. I have discussed plan of care with patient and they are in understanding. They were instructed to read discharge paperwork. All of their questions and concerns were addressed. Time Spent: 0 day      --  [facility]  Emergency Medicine Resident Physician    This dictation was generated by voice recognition computer software. Although all attempts are made to edit the dictation for accuracy, there may be errors in the transcription that are not intended.

## 2020-01-19 LAB
EKG ATRIAL RATE: 89 BPM
EKG P AXIS: 38 DEGREES
EKG P-R INTERVAL: 142 MS
EKG Q-T INTERVAL: 382 MS
EKG QRS DURATION: 88 MS
EKG QTC CALCULATION (BAZETT): 464 MS
EKG R AXIS: 22 DEGREES
EKG T AXIS: 30 DEGREES
EKG VENTRICULAR RATE: 89 BPM

## 2020-01-19 PROCEDURE — 93010 ELECTROCARDIOGRAM REPORT: CPT | Performed by: INTERNAL MEDICINE

## 2020-05-11 PROBLEM — M47.812 CERVICAL SPONDYLOSIS: Status: ACTIVE | Noted: 2020-05-11

## 2020-05-11 PROBLEM — M48.02 SPINAL STENOSIS IN CERVICAL REGION: Status: ACTIVE | Noted: 2020-05-11

## 2020-05-11 PROBLEM — M54.12 CERVICAL RADICULOPATHY: Status: ACTIVE | Noted: 2020-05-11

## 2020-05-11 PROBLEM — L65.9 ALOPECIA: Status: ACTIVE | Noted: 2018-04-25

## 2020-07-17 ENCOUNTER — HOSPITAL ENCOUNTER (OUTPATIENT)
Age: 45
Setting detail: SPECIMEN
Discharge: HOME | End: 2020-07-17
Payer: COMMERCIAL

## 2020-07-17 ENCOUNTER — OFFICE VISIT (OUTPATIENT)
Dept: FAMILY MEDICINE CLINIC | Age: 45
End: 2020-07-17
Payer: COMMERCIAL

## 2020-07-17 VITALS
WEIGHT: 215 LBS | HEART RATE: 83 BPM | RESPIRATION RATE: 16 BRPM | TEMPERATURE: 97.3 F | BODY MASS INDEX: 39.56 KG/M2 | OXYGEN SATURATION: 98 % | HEIGHT: 62 IN

## 2020-07-17 PROCEDURE — 99202 OFFICE O/P NEW SF 15 MIN: CPT | Performed by: NURSE PRACTITIONER

## 2020-07-17 ASSESSMENT — ENCOUNTER SYMPTOMS
VOMITING: 0
COUGH: 0
ABDOMINAL PAIN: 0
RHINORRHEA: 0
DIARRHEA: 1
NAUSEA: 0
SORE THROAT: 1

## 2020-07-17 NOTE — PROGRESS NOTES
[facility]  [address] [address]  Dept: [phone]  Dept Fax: [phone]    Estephanie Spring is a 54 y.o. female who presents to the urgent care today for her medicalconditions/complaints as noted below. Estephanie Spring is c/o of Pharyngitis (daughter has tested positive for covid   two days ago with scratchy throat and headache that will not go away  throat has worsened and very fatigue with slight loss of taste ); Headache; and Fatigue      HPI:     43-year-old female patient presents with complaint of positive COVID-19 exposure which occurred 10 days ago. Reports that over the past 2 to 3 days she has developed symptoms including sore throat, headache, generalized fatigue, loss of taste and mild diarrhea. Denies chest pains or shortness of breath. Denies fevers or chills. Past Medical History:   Diagnosis Date    Anxiety     GERD (gastroesophageal reflux disease)     History of colon polyps 10/23/2019    tubular adenoma        Current Outpatient Medications   Medication Sig Dispense Refill    meloxicam (MOBIC) 15 MG tablet Take 15 mg by mouth daily      lansoprazole (PREVACID) 30 MG delayed release capsule TAKE ONE CAPSULE BY MOUTH DAILY 90 capsule 3    LYRICA 200 MG capsule Take 200 mg by mouth 2 times daily.  tiZANidine (ZANAFLEX) 4 MG tablet 4 mg every 8 hours as needed        No current facility-administered medications for this visit. Allergies   Allergen Reactions    Latex Other (See Comments)     Turns bright red  Turns bright red    Penicillins Hives and Shortness Of Breath    Nsaids Swelling     Face swells and feels flushed.  Tolmetin Swelling     Face swells and feels flushed. Subjective:      Review of Systems   Constitutional: Positive for fatigue. Negative for chills and fever. HENT: Positive for sore throat. Negative for congestion, ear pain and rhinorrhea. Abnormal taste   Respiratory: Negative for cough. Cardiovascular: Negative for chest pain. Gastrointestinal: Positive for diarrhea. Negative for abdominal pain, nausea and vomiting. Neurological: Positive for headaches. All other systems reviewed and are negative. Objective:     Physical Exam  Vitals signs and nursing note reviewed. HENT:      Nose: Nose normal.      Mouth/Throat:      Mouth: Mucous membranes are moist.   Cardiovascular:      Rate and Rhythm: Normal rate. Pulmonary:      Effort: Pulmonary effort is normal.      Breath sounds: Normal breath sounds. Skin:     General: Skin is warm and dry. Neurological:      General: No focal deficit present. Mental Status: She is oriented to person, place, and time. Pulse 83   Temp 97.3 °F (36.3 °C)   Resp 16   Ht 5' 2\" (1.575 m)   Wt 215 lb (97.5 kg)   SpO2 98%   BMI 39.32 kg/m²   Lab Review   No visits with results within 2 Month(s) from this visit.    Latest known visit with results is:   Admission on 01/17/2020, Discharged on 01/17/2020   Component Date Value    WBC 01/17/2020 14.4*    RBC 01/17/2020 4.31     Hemoglobin 01/17/2020 12.8     Hematocrit 01/17/2020 39.7     MCV 01/17/2020 92.1     MCH 01/17/2020 29.7     MCHC 01/17/2020 32.2     RDW 01/17/2020 13.0     Platelets 58/81/1706 363     MPV 01/17/2020 10.9     NRBC Automated 01/17/2020 0.0     Differential Type 01/17/2020 NOT REPORTED     Seg Neutrophils 01/17/2020 65     Lymphocytes 01/17/2020 22*    Monocytes 01/17/2020 9     Eosinophils % 01/17/2020 2     Basophils 01/17/2020 1     Immature Granulocytes 01/17/2020 1*    Segs Absolute 01/17/2020 9.49*    Absolute Lymph # 01/17/2020 3.12     Absolute Mono # 01/17/2020 1.31*    Absolute Eos # 01/17/2020 0.22     Basophils Absolute 01/17/2020 0.08     Absolute Immature Granul* 01/17/2020 0.20     WBC Morphology 01/17/2020 NOT REPORTED     RBC Morphology 01/17/2020 NOT REPORTED     Platelet Immature Granulocytes 01/17/2020 1*    Segs Absolute 01/17/2020 7.26     Absolute Lymph # 01/17/2020 2.52     Absolute Mono # 01/17/2020 0.92     Absolute Eos # 01/17/2020 0.19     Basophils Absolute 01/17/2020 0.08     Absolute Immature Granul* 01/17/2020 0.12     WBC Morphology 01/17/2020 NOT REPORTED     RBC Morphology 01/17/2020 NOT REPORTED     Platelet Estimate 15/23/4377 NOT REPORTED     Glucose 01/17/2020 75     BUN 01/17/2020 8     CREATININE 01/17/2020 0.69     Bun/Cre Ratio 01/17/2020 NOT REPORTED     Calcium 01/17/2020 8.4*    Sodium 01/17/2020 139     Potassium 01/17/2020 3.4*    Chloride 01/17/2020 104     CO2 01/17/2020 23     Anion Gap 01/17/2020 12     Alkaline Phosphatase 01/17/2020 54     ALT 01/17/2020 31     AST 01/17/2020 20     Total Bilirubin 01/17/2020 0.65     Total Protein 01/17/2020 6.3*    Alb 01/17/2020 3.5     Albumin/Globulin Ratio 01/17/2020 1.3     GFR Non- 01/17/2020 >60     GFR  01/17/2020 >60     GFR Comment 01/17/2020          GFR Staging 01/17/2020 NOT REPORTED     Lipase 01/17/2020 50     Ventricular Rate 01/17/2020 71     Atrial Rate 01/17/2020 71     P-R Interval 01/17/2020 158     QRS Duration 01/17/2020 96     Q-T Interval 01/17/2020 422     QTc Calculation (Bazett) 01/17/2020 458     P Axis 01/17/2020 34     R Axis 01/17/2020 19     T Axis 01/17/2020 25     Troponin, High Sensitivi* 01/17/2020 <6     Troponin T 01/17/2020 NOT REPORTED     Troponin Interp 01/17/2020 NOT REPORTED     Ventricular Rate 01/17/2020 81     Atrial Rate 01/17/2020 81     P-R Interval 01/17/2020 158     QRS Duration 01/17/2020 94     Q-T Interval 01/17/2020 396     QTc Calculation (Bazett) 01/17/2020 460     P Axis 01/17/2020 52     R Axis 01/17/2020 29     T Axis 01/17/2020 35        Assessment:       Diagnosis Orders   1. Exposure to COVID-19 virus  COVID-19 Ambulatory   2.  Sore throat  COVID-19 Ambulatory Plan:      Return if symptoms worsen or fail to improve. No orders of the defined types were placed in this encounter.    ]  [address] with COVID-19 may have no symptoms, mild symptoms, such as fever, cough, and shortness of breath or they may have more severe illness, developing severe and fatal pneumonia. As a result, Advance Care Planning with attention to naming a health care decision maker (someone you trust to make healthcare decisions for you if you could not speak for yourself) and sharing other health care preferences is important BEFORE a possible health crisis. Please contact your Primary Care Provider to discuss Advance Care Planning. Preventing the Spread of Coronavirus Disease 2019 in Homes and Residential Communities  For the most recent information go to [employer].    Prevention steps for People with confirmed or suspected COVID-19 (including persons under investigation) who do not need to be hospitalized  and   People with confirmed COVID-19 who were hospitalized and determined to be medically stable to go home    Your healthcare provider and public health staff will evaluate whether you can be cared for at home. If it is determined that you do not need to be hospitalized and can be isolated at home, you will be monitored by staff from your local or state health department. You should follow the prevention steps below until a healthcare provider or local or state health department says you can return to your normal activities. Stay home except to get medical care  People who are mildly ill with COVID-19 are able to isolate at home during their illness. You should restrict activities outside your home, except for getting medical care. Do not go to work, school, or public areas. Avoid using public transportation, ride-sharing, or taxis.   Separate yourself from other people and animals in your home  People: As much as possible, you should stay in a specific room and away from other people in your home. Also, you should use a separate bathroom, if available. Animals: You should restrict contact with pets and other animals while you are sick with COVID-19, just like you would around other people. Although there have not been reports of pets or other animals becoming sick with COVID-19, it is still recommended that people sick with COVID-19 limit contact with animals until more information is known about the virus. When possible, have another member of your household care for your animals while you are sick. If you are sick with COVID-19, avoid contact with your pet, including petting, snuggling, being kissed or licked, and sharing food. If you must care for your pet or be around animals while you are sick, wash your hands before and after you interact with pets and wear a facemask. Call ahead before visiting your doctor  If you have a medical appointment, call the healthcare provider and tell them that you have or may have COVID-19. This will help the healthcare providers office take steps to keep other people from getting infected or exposed. Wear a facemask  You should wear a facemask when you are around other people (e.g., sharing a room or vehicle) or pets and before you enter a healthcare providers office. If you are not able to wear a facemask (for example, because it causes trouble breathing), then people who live with you should not stay in the same room with you, or they should wear a facemask if they enter your room. Cover your coughs and sneezes  Cover your mouth and nose with a tissue when you cough or sneeze. Throw used tissues in a lined trash can. Immediately wash your hands with soap and water for at least 20 seconds or, if soap and water are not available, clean your hands with an alcohol-based hand  that contains at least 60% alcohol.   Clean your hands often  Wash your hands often with soap and water for at least 20 seconds, especially after blowing your nose, coughing, or sneezing; going to the bathroom; and before eating or preparing food. If soap and water are not readily available, use an alcohol-based hand  with at least 60% alcohol, covering all surfaces of your hands and rubbing them together until they feel dry. Soap and water are the best option if hands are visibly dirty. Avoid touching your eyes, nose, and mouth with unwashed hands. Avoid sharing personal household items  You should not share dishes, drinking glasses, cups, eating utensils, towels, or bedding with other people or pets in your home. After using these items, they should be washed thoroughly with soap and water. Clean all high-touch surfaces everyday  High touch surfaces include counters, tabletops, doorknobs, bathroom fixtures, toilets, phones, keyboards, tablets, and bedside tables. Also, clean any surfaces that may have blood, stool, or body fluids on them. Use a household cleaning spray or wipe, according to the label instructions. Labels contain instructions for safe and effective use of the cleaning product including precautions you should take when applying the product, such as wearing gloves and making sure you have good ventilation during use of the product. Monitor your symptoms  Seek prompt medical attention if your illness is worsening (e.g., difficulty breathing). Before seeking care, call your healthcare provider and tell them that you have, or are being evaluated for, COVID-19. Put on a facemask before you enter the facility. These steps will help the healthcare providers office to keep other people in the office or waiting room from getting infected or exposed. Ask your healthcare provider to call the local or [facility] health department.  Persons who are placed under active monitoring or facilitated self-monitoring should follow instructions provided by their local health department

## 2020-07-17 NOTE — PATIENT INSTRUCTIONS
Patient Education        Viral Infections: Care Instructions  Your Care Instructions     You don't feel well, but it's not clear what's causing it. You may have a viral infection. Viruses cause many illnesses, such as the common cold, influenza, fever, rashes, and the diarrhea, nausea, and vomiting that are often called \"stomach flu. \" You may wonder if antibiotic medicines could make you feel better. But antibiotics only treat infections caused by bacteria. They don't work on viruses. The good news is that viral infections usually aren't serious. Most will go away in a few days without medical treatment. In the meantime, there are a few things you can do to make yourself more comfortable. Follow-up care is a key part of your treatment and safety. Be sure to make and go to all appointments, and call your doctor if you are having problems. It's also a good idea to know your test results and keep a list of the medicines you take. How can you care for yourself at home? · Get plenty of rest if you feel tired. · Take an over-the-counter pain medicine if needed, such as acetaminophen (Tylenol), ibuprofen (Advil, Motrin), or naproxen (Aleve). Read and follow all instructions on the label. · Be careful when taking over-the-counter cold or flu medicines and Tylenol at the same time. Many of these medicines have acetaminophen, which is Tylenol. Read the labels to make sure that you are not taking more than the recommended dose. Too much acetaminophen (Tylenol) can be harmful. · Drink plenty of fluids, enough so that your urine is light yellow or clear like water. If you have kidney, heart, or liver disease and have to limit fluids, talk with your doctor before you increase the amount of fluids you drink. · Stay home from work, school, and other public places while you have a fever. When should you call for help? [id number] anytime you think you may need emergency care.  For example, call if:  · You have severe trouble breathing. · You passed out (lost consciousness). Call your doctor now or seek immediate medical care if:  · You seem to be getting much sicker. · You have a new or higher fever. · You have blood in your stools. · You have new belly pain, or your pain gets worse. · You have a new rash. Watch closely for changes in your health, and be sure to contact your doctor if:  · You start to get better and then get worse. · You do not get better as expected. Where can you learn more? Go to https://[employer]box.iCoolhunt. org and sign in to your [employer] account. Enter Q247 in the [employer] box to learn more about \"Viral Infections: Care Instructions. \"     If you do not have an account, please click on the \"Sign Up Now\" link. Current as of: February 11, 2020               Content Version: 12.5  © 4424-8631 Healthwise, Incorporated. Care instructions adapted under license by [facility] ([facility]). If you have questions about a medical condition or this instruction, always ask your healthcare professional. [address] any warranty or liability for your use of this information.

## 2020-07-17 NOTE — LETTER
[address] [place] [address]  [facility] [address]  Phone: [phone]  Fax: [phone]    Junious Holter, APRN - CNP        July 17, 2020     Patient: Tristen Bonner   YOB: 1975   Date of Visit: 7/17/2020       To Whom It May Concern: It is my medical opinion that Tristen Bonner is excused pending COVID results. If you have any questions or concerns, please don't hesitate to call.     Sincerely,        Junious Holter, APRN - CNP

## 2020-07-23 ENCOUNTER — TELEPHONE (OUTPATIENT)
Dept: FAMILY MEDICINE CLINIC | Age: 45
End: 2020-07-23

## 2020-07-23 LAB — SARS-COV-2, NAA: NOT DETECTED

## 2020-07-23 NOTE — TELEPHONE ENCOUNTER
Pt Spouse Brittaney Shea called to get an update on his and his wife, Sally's Cvid tests. Neither has received information in My Chart and would like a call back. Encounter entered in his chart as well. Please contact Sally on her cell- [phone]    Also please note that Pt MARTY Consent is unreadable in her chart.

## 2021-10-23 ENCOUNTER — HOSPITAL ENCOUNTER (EMERGENCY)
Age: 46
Discharge: HOME | End: 2021-10-24
Attending: EMERGENCY MEDICINE
Payer: COMMERCIAL

## 2021-10-23 VITALS
HEART RATE: 90 BPM | WEIGHT: 205 LBS | OXYGEN SATURATION: 98 % | HEIGHT: 61 IN | SYSTOLIC BLOOD PRESSURE: 179 MMHG | DIASTOLIC BLOOD PRESSURE: 89 MMHG | RESPIRATION RATE: 18 BRPM | TEMPERATURE: 99 F | BODY MASS INDEX: 38.71 KG/M2

## 2021-10-23 DIAGNOSIS — S01.81XA FACIAL LACERATION, INITIAL ENCOUNTER: Primary | ICD-10-CM

## 2021-10-23 PROCEDURE — 12011 RPR F/E/E/N/L/M 2.5 CM/<: CPT

## 2021-10-23 PROCEDURE — 99283 EMERGENCY DEPT VISIT LOW MDM: CPT

## 2021-10-23 ASSESSMENT — PAIN DESCRIPTION - PAIN TYPE: TYPE: ACUTE PAIN

## 2021-10-23 ASSESSMENT — PAIN DESCRIPTION - LOCATION: LOCATION: HEAD

## 2021-10-23 ASSESSMENT — PAIN SCALES - GENERAL: PAINLEVEL_OUTOF10: 6

## 2021-10-24 ENCOUNTER — APPOINTMENT (OUTPATIENT)
Dept: CT IMAGING | Age: 46
End: 2021-10-24
Payer: COMMERCIAL

## 2021-10-24 ENCOUNTER — APPOINTMENT (OUTPATIENT)
Dept: GENERAL RADIOLOGY | Age: 46
End: 2021-10-24
Payer: COMMERCIAL

## 2021-10-24 PROCEDURE — 70450 CT HEAD/BRAIN W/O DYE: CPT

## 2021-10-24 PROCEDURE — 73130 X-RAY EXAM OF HAND: CPT

## 2021-10-24 PROCEDURE — 73564 X-RAY EXAM KNEE 4 OR MORE: CPT

## 2021-10-24 RX ORDER — ACETAMINOPHEN 325 MG/1
650 TABLET ORAL EVERY 6 HOURS PRN
Qty: 60 TABLET | Refills: 0 | Status: SHIPPED | OUTPATIENT
Start: 2021-10-24 | End: 2022-02-19

## 2021-10-24 ASSESSMENT — ENCOUNTER SYMPTOMS
CONSTIPATION: 0
DIARRHEA: 0
ABDOMINAL PAIN: 0
SHORTNESS OF BREATH: 0
NAUSEA: 0
FACIAL SWELLING: 0
BLOOD IN STOOL: 0
RHINORRHEA: 0
VOMITING: 0
SORE THROAT: 0
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
## 2022-02-19 ENCOUNTER — APPOINTMENT (OUTPATIENT)
Dept: GENERAL RADIOLOGY | Age: 47
End: 2022-02-19
Payer: COMMERCIAL

## 2022-02-19 ENCOUNTER — HOSPITAL ENCOUNTER (OUTPATIENT)
Age: 47
Setting detail: OBSERVATION
Discharge: HOME | End: 2022-02-20
Attending: EMERGENCY MEDICINE | Admitting: EMERGENCY MEDICINE
Payer: COMMERCIAL

## 2022-02-19 ENCOUNTER — APPOINTMENT (OUTPATIENT)
Dept: CT IMAGING | Age: 47
End: 2022-02-19
Payer: COMMERCIAL

## 2022-02-19 DIAGNOSIS — M54.12 CERVICAL RADICULOPATHY: ICD-10-CM

## 2022-02-19 DIAGNOSIS — M25.572 ACUTE LEFT ANKLE PAIN: Primary | ICD-10-CM

## 2022-02-19 DIAGNOSIS — S93.402A SPRAIN OF LEFT ANKLE, UNSPECIFIED LIGAMENT, INITIAL ENCOUNTER: ICD-10-CM

## 2022-02-19 PROCEDURE — 99285 EMERGENCY DEPT VISIT HI MDM: CPT

## 2022-02-19 PROCEDURE — 72125 CT NECK SPINE W/O DYE: CPT

## 2022-02-19 PROCEDURE — 73610 X-RAY EXAM OF ANKLE: CPT

## 2022-02-19 PROCEDURE — 6370000000 HC RX 637 (ALT 250 FOR IP): Performed by: PEDIATRICS

## 2022-02-19 RX ORDER — OXYCODONE HYDROCHLORIDE AND ACETAMINOPHEN 5; 325 MG/1; MG/1
2 TABLET ORAL ONCE
Status: COMPLETED | OUTPATIENT
Start: 2022-02-19 | End: 2022-02-19

## 2022-02-19 RX ADMIN — OXYCODONE HYDROCHLORIDE AND ACETAMINOPHEN 2 TABLET: 5; 325 TABLET ORAL at 22:57

## 2022-02-19 ASSESSMENT — PAIN DESCRIPTION - LOCATION: LOCATION: BACK;ANKLE

## 2022-02-19 ASSESSMENT — PAIN DESCRIPTION - FREQUENCY: FREQUENCY: CONTINUOUS

## 2022-02-19 ASSESSMENT — PAIN SCALES - GENERAL
PAINLEVEL_OUTOF10: 5
PAINLEVEL_OUTOF10: 4

## 2022-02-19 ASSESSMENT — PAIN - FUNCTIONAL ASSESSMENT: PAIN_FUNCTIONAL_ASSESSMENT: 0-10

## 2022-02-19 NOTE — Clinical Note
Srini Bragg was seen and treated in our emergency department on 2/19/2022. She may return to work on 02/21/2022. Light duty until follow up with sports medicine clinic for left ankle injury     If you have any questions or concerns, please don't hesitate to call.       Fransisco Lee MD

## 2022-02-20 VITALS
HEIGHT: 62 IN | RESPIRATION RATE: 18 BRPM | OXYGEN SATURATION: 97 % | BODY MASS INDEX: 39.56 KG/M2 | WEIGHT: 215 LBS | HEART RATE: 77 BPM | TEMPERATURE: 98 F | SYSTOLIC BLOOD PRESSURE: 122 MMHG | DIASTOLIC BLOOD PRESSURE: 74 MMHG

## 2022-02-20 LAB
SARS-COV-2, RAPID: NOT DETECTED
SPECIMEN DESCRIPTION: NORMAL

## 2022-02-20 PROCEDURE — 96374 THER/PROPH/DIAG INJ IV PUSH: CPT

## 2022-02-20 PROCEDURE — 6370000000 HC RX 637 (ALT 250 FOR IP): Performed by: PEDIATRICS

## 2022-02-20 PROCEDURE — 87635 SARS-COV-2 COVID-19 AMP PRB: CPT

## 2022-02-20 PROCEDURE — G0378 HOSPITAL OBSERVATION PER HR: HCPCS

## 2022-02-20 PROCEDURE — 96375 TX/PRO/DX INJ NEW DRUG ADDON: CPT

## 2022-02-20 PROCEDURE — 2580000003 HC RX 258: Performed by: PEDIATRICS

## 2022-02-20 PROCEDURE — 6360000002 HC RX W HCPCS: Performed by: PEDIATRICS

## 2022-02-20 RX ORDER — ACETAMINOPHEN 650 MG/1
650 SUPPOSITORY RECTAL EVERY 6 HOURS PRN
Status: DISCONTINUED | OUTPATIENT
Start: 2022-02-20 | End: 2022-02-20 | Stop reason: HOSPADM

## 2022-02-20 RX ORDER — PANTOPRAZOLE SODIUM 20 MG/1
20 TABLET, DELAYED RELEASE ORAL
Status: DISCONTINUED | OUTPATIENT
Start: 2022-02-20 | End: 2022-02-20 | Stop reason: HOSPADM

## 2022-02-20 RX ORDER — TIZANIDINE 4 MG/1
4 TABLET ORAL EVERY 8 HOURS PRN
Status: DISCONTINUED | OUTPATIENT
Start: 2022-02-20 | End: 2022-02-20 | Stop reason: HOSPADM

## 2022-02-20 RX ORDER — POTASSIUM CHLORIDE 20 MEQ/1
40 TABLET, EXTENDED RELEASE ORAL PRN
Status: DISCONTINUED | OUTPATIENT
Start: 2022-02-20 | End: 2022-02-20 | Stop reason: HOSPADM

## 2022-02-20 RX ORDER — DIPHENHYDRAMINE HYDROCHLORIDE 50 MG/ML
25 INJECTION INTRAMUSCULAR; INTRAVENOUS EVERY 6 HOURS PRN
Status: DISCONTINUED | OUTPATIENT
Start: 2022-02-20 | End: 2022-02-20 | Stop reason: HOSPADM

## 2022-02-20 RX ORDER — PROCHLORPERAZINE EDISYLATE 5 MG/ML
10 INJECTION INTRAMUSCULAR; INTRAVENOUS ONCE
Status: COMPLETED | OUTPATIENT
Start: 2022-02-20 | End: 2022-02-20

## 2022-02-20 RX ORDER — POLYETHYLENE GLYCOL 3350 17 G/17G
17 POWDER, FOR SOLUTION ORAL DAILY PRN
Status: DISCONTINUED | OUTPATIENT
Start: 2022-02-20 | End: 2022-02-20 | Stop reason: HOSPADM

## 2022-02-20 RX ORDER — ACETAMINOPHEN 325 MG/1
650 TABLET ORAL EVERY 6 HOURS PRN
Status: DISCONTINUED | OUTPATIENT
Start: 2022-02-20 | End: 2022-02-20 | Stop reason: HOSPADM

## 2022-02-20 RX ORDER — SODIUM CHLORIDE 9 MG/ML
25 INJECTION, SOLUTION INTRAVENOUS PRN
Status: DISCONTINUED | OUTPATIENT
Start: 2022-02-20 | End: 2022-02-20 | Stop reason: HOSPADM

## 2022-02-20 RX ORDER — SODIUM CHLORIDE 0.9 % (FLUSH) 0.9 %
5-40 SYRINGE (ML) INJECTION EVERY 12 HOURS SCHEDULED
Status: DISCONTINUED | OUTPATIENT
Start: 2022-02-20 | End: 2022-02-20 | Stop reason: HOSPADM

## 2022-02-20 RX ORDER — ONDANSETRON 2 MG/ML
4 INJECTION INTRAMUSCULAR; INTRAVENOUS EVERY 4 HOURS PRN
Status: DISCONTINUED | OUTPATIENT
Start: 2022-02-20 | End: 2022-02-20 | Stop reason: HOSPADM

## 2022-02-20 RX ORDER — OXYCODONE HYDROCHLORIDE 5 MG/1
2.5 TABLET ORAL EVERY 8 HOURS PRN
Qty: 8 TABLET | Refills: 0 | Status: SHIPPED | OUTPATIENT
Start: 2022-02-20 | End: 2022-02-25

## 2022-02-20 RX ORDER — SODIUM CHLORIDE 9 MG/ML
INJECTION, SOLUTION INTRAVENOUS CONTINUOUS
Status: DISCONTINUED | OUTPATIENT
Start: 2022-02-20 | End: 2022-02-20 | Stop reason: HOSPADM

## 2022-02-20 RX ORDER — SODIUM CHLORIDE 0.9 % (FLUSH) 0.9 %
5-40 SYRINGE (ML) INJECTION PRN
Status: DISCONTINUED | OUTPATIENT
Start: 2022-02-20 | End: 2022-02-20 | Stop reason: HOSPADM

## 2022-02-20 RX ORDER — PREGABALIN 100 MG/1
200 CAPSULE ORAL 2 TIMES DAILY
Status: DISCONTINUED | OUTPATIENT
Start: 2022-02-20 | End: 2022-02-20 | Stop reason: HOSPADM

## 2022-02-20 RX ORDER — OXYCODONE HYDROCHLORIDE 5 MG/1
2.5 TABLET ORAL EVERY 4 HOURS PRN
Status: DISCONTINUED | OUTPATIENT
Start: 2022-02-20 | End: 2022-02-20 | Stop reason: HOSPADM

## 2022-02-20 RX ORDER — POTASSIUM CHLORIDE 7.45 MG/ML
10 INJECTION INTRAVENOUS PRN
Status: DISCONTINUED | OUTPATIENT
Start: 2022-02-20 | End: 2022-02-20 | Stop reason: HOSPADM

## 2022-02-20 RX ADMIN — ACETAMINOPHEN 650 MG: 325 TABLET ORAL at 09:17

## 2022-02-20 RX ADMIN — OXYCODONE 2.5 MG: 5 TABLET ORAL at 03:55

## 2022-02-20 RX ADMIN — SODIUM CHLORIDE: 9 INJECTION, SOLUTION INTRAVENOUS at 01:29

## 2022-02-20 RX ADMIN — OXYCODONE 2.5 MG: 5 TABLET ORAL at 09:16

## 2022-02-20 RX ADMIN — ACETAMINOPHEN 650 MG: 325 TABLET ORAL at 01:45

## 2022-02-20 RX ADMIN — PANTOPRAZOLE SODIUM 20 MG: 20 TABLET, DELAYED RELEASE ORAL at 08:15

## 2022-02-20 RX ADMIN — DIPHENHYDRAMINE HYDROCHLORIDE 25 MG: 50 INJECTION, SOLUTION INTRAMUSCULAR; INTRAVENOUS at 03:56

## 2022-02-20 RX ADMIN — PROCHLORPERAZINE EDISYLATE 10 MG: 5 INJECTION INTRAMUSCULAR; INTRAVENOUS at 03:56

## 2022-02-20 RX ADMIN — SODIUM CHLORIDE, PRESERVATIVE FREE 10 ML: 5 INJECTION INTRAVENOUS at 09:20

## 2022-02-20 RX ADMIN — PREGABALIN 200 MG: 100 CAPSULE ORAL at 08:16

## 2022-02-20 ASSESSMENT — ENCOUNTER SYMPTOMS
VOMITING: 0
TROUBLE SWALLOWING: 0
CONSTIPATION: 0
ABDOMINAL DISTENTION: 0
DIARRHEA: 0
CHOKING: 0
WHEEZING: 0
CHEST TIGHTNESS: 0
BACK PAIN: 1
SHORTNESS OF BREATH: 0
SORE THROAT: 0
COUGH: 0
RHINORRHEA: 0
NAUSEA: 0
ABDOMINAL PAIN: 0

## 2022-02-20 ASSESSMENT — PAIN DESCRIPTION - FREQUENCY: FREQUENCY: CONTINUOUS

## 2022-02-20 ASSESSMENT — PAIN DESCRIPTION - ONSET: ONSET: ON-GOING

## 2022-02-20 ASSESSMENT — PAIN DESCRIPTION - PROGRESSION: CLINICAL_PROGRESSION: NOT CHANGED

## 2022-02-20 ASSESSMENT — PAIN SCALES - GENERAL
PAINLEVEL_OUTOF10: 5
PAINLEVEL_OUTOF10: 8

## 2022-02-20 ASSESSMENT — PAIN DESCRIPTION - LOCATION: LOCATION: ANKLE;BACK;NECK

## 2022-02-20 ASSESSMENT — PAIN DESCRIPTION - PAIN TYPE: TYPE: ACUTE PAIN

## 2022-02-20 NOTE — CONSULTS
Hi  22.    Department of Neurosurgery  Resident Consult Note      Reason for Consult:  Cervical Radiculopathy   Requesting Physician:  Dr. Andres Bronson:   [x]Dr. Stefani Chavez   []Dr. Winsome Sapp  []Dr. Xiomara Saunders  []Dr. Fidelia Calvillo  []Dr. Luana Harrington  []Dr. Zonia Rodríguez    History Obtained From:  patient, electronic medical record, medical care team     CHIEF COMPLAINT:         Fall, neck pain    HISTORY OF PRESENT ILLNESS:       The patient is a 51 y.o. female who presents with right sided cervical radiculopathy after fall this evening. Patient slipped and fell on ice, fell backward and hit her back as well as neck she believes. No LOC. Does have continued neck pain, presented due to concerns that her cervical hardware may have been damaged. Does note that she has right sided tingling sensation which originates in her cervical spine and shoots down her right arm into thumb, index, and middle finger. Tingling/pain is worse with movement of right arm. States that she has previously had pain similar to this although she has not felt it since her previous surgeries. Denies weakness. Does not have midline tenderness, states that she feels the pain in the anterior aspect of neck. No other acute complaints. Was operated on by Dr. Kimmy Tavarez, anterior approach initially, revision was posterior approach. Fusion of C5-C6. Does still follow with Dr. Erin Nicole as outpatient. Last operation was 2017/2018 per patient.      Neurosurgery consulted due to persistent pain/cervical radiculopathy despite negative CT cervical.     PAST MEDICAL HISTORY :       Past Medical History:        Diagnosis Date    Anxiety     COVID-19 11/30/2020    Tested at [facility] - anosmia/ageusia     GERD (gastroesophageal reflux disease)     History of colon polyps 10/23/2019    tubular adenoma       Past Surgical History:        Procedure Laterality Date    APPENDECTOMY      CARPAL TUNNEL RELEASE Right 11/02/2016    Dr. Reina Suero CARPAL TUNNEL RELEASE Left 01/04/2017    Dr Bethany Ocampo SURGERY  07/03/2018    C5-6     COLONOSCOPY N/A 10/23/2019    COLONOSCOPY WITH BIOPSY performed by Marcos De Jesus MD at [address] [place] [address] Rd Right     SPINAL FUSION      UPPER GASTROINTESTINAL ENDOSCOPY N/A 10/23/2019    EGD BIOPSY performed by Marcos De Jesus MD at [facility] History:   Social History     Socioeconomic History    Marital status:      Spouse name: Not on file    Number of children: Not on file    Years of education: Not on file    Highest education level: Not on file   Occupational History    Occupation:    Tobacco Use    Smoking status: Never Smoker    Smokeless tobacco: Never Used   Vaping Use    Vaping Use: Never used   Substance and Sexual Activity    Alcohol use: Yes     Comment: occ    Drug use: No    Sexual activity: Yes     Partners: Male   Other Topics Concern    Not on file   Social History Narrative    Not on file     Social Determinants of Health     Financial Resource Strain:     Difficulty of Paying Living Expenses: Not on file   Food Insecurity:     Worried About Running Out of Food in the Last Year: Not on file    Darryn of Food in the Last Year: Not on file   Transportation Needs:     Lack of Transportation (Medical): Not on file    Lack of Transportation (Non-Medical):  Not on file   Physical Activity:     Days of Exercise per Week: Not on file    Minutes of Exercise per Session: Not on file   Stress:     Feeling of Stress : Not on file   Social Connections:     Frequency of Communication with Friends and Family: Not on file    Frequency of Social Gatherings with Friends and Family: Not on file    Attends [employer] Services: Not on file    Active Member of Clubs or Organizations: Not on file    Attends Club or Organization Meetings: Not on file    Marital Status: Not on file   Intimate Partner Violence:     Fear of Current or Ex-Partner: Not on file    Emotionally Abused: Not on file    Physically Abused: Not on file    Sexually Abused: Not on file   Housing Stability:     Unable to Pay for Housing in the Last Year: Not on file    Number of Matt in the Last Year: Not on file    Unstable Housing in the Last Year: Not on file       Family History:       Problem Relation Age of Onset    Dementia Father     Parkinsonism Brother        Allergies:   Latex, Penicillins, Watermelon flavor, Nsaids, and Tolmetin    Home Medications:  Prior to Admission medications    Medication Sig Start Date End Date Taking? Authorizing Provider   lansoprazole (PREVACID) 30 MG delayed release capsule TAKE ONE CAPSULE BY MOUTH DAILY 2/18/22  Yes Lindsey Mayfield,    LYRICA 200 MG capsule Take 200 mg by mouth 2 times daily.   11/7/18  Yes Historical Provider, MD   tiZANidine (ZANAFLEX) 4 MG tablet 4 mg every 8 hours as needed  10/6/17  Yes Historical Provider, MD       Current Medications:   Current Facility-Administered Medications: pantoprazole (PROTONIX) tablet 20 mg, 20 mg, Oral, QAM AC  pregabalin (LYRICA) capsule 200 mg, 200 mg, Oral, BID  tiZANidine (ZANAFLEX) tablet 4 mg, 4 mg, Oral, Q8H PRN  0.9 % sodium chloride infusion, , IntraVENous, Continuous  sodium chloride flush 0.9 % injection 5-40 mL, 5-40 mL, IntraVENous, 2 times per day  sodium chloride flush 0.9 % injection 5-40 mL, 5-40 mL, IntraVENous, PRN  0.9 % sodium chloride infusion, 25 mL, IntraVENous, PRN  potassium chloride (KLOR-CON M) extended release tablet 40 mEq, 40 mEq, Oral, PRN **OR** potassium bicarb-citric acid (EFFER-K) effervescent tablet 40 mEq, 40 mEq, Oral, PRN **OR** potassium chloride 10 mEq/100 mL IVPB (Peripheral Line), 10 mEq, IntraVENous, PRN  enoxaparin (LOVENOX) injection 40 mg, 40 mg, SubCUTAneous, Daily  ondansetron (ZOFRAN) injection 4 mg, 4 mg, IntraVENous, Q4H PRN  polyethylene glycol (GLYCOLAX) packet 17 g, 17 g, Oral, Daily PRN  acetaminophen (TYLENOL) tablet 650 mg, 650 mg, Oral, Q6H PRN **OR** acetaminophen (TYLENOL) suppository 650 mg, 650 mg, Rectal, Q6H PRN    REVIEW OF SYSTEMS:       General ROS - No fevers, no chills  Ophthalmic ROS - No changes in vision from baseline  ENT ROS -  No sore throat, no rhinorrhea  Respiratory ROS - no cough, no shortness of breath  Cardiovascular ROS - No chest pain  Gastrointestinal ROS - No abdominal pain, no nausea, no vomiting  Genito-Urinary ROS - No dysuria  Musculoskeletal ROS - positive for neck pain, and back pain  Neurological ROS - No focal weakness or loss of sensation, no headache, positive for tingling down right arm   Dermatological ROS - No lesions, no rash  Vascular/Lymphatic ROS - No edema    PHYSICAL EXAM:       Vitals:    02/19/22 2218   BP: (!) 162/105   Pulse: 119   Resp: 20   Temp: 97.9 °F (36.6 °C)   SpO2: 97%       CONSTITUTIONAL:  Well developed, well nourished, alert and oriented x 3, in no acute distress, nontoxic. No dysarthria, no aphasia. EOMI.     HEAD:  normocephalic, atraumatic    EYES:  PERRL, EOMI   ENT:  moist mucous membranes, no stridor, no tracheal deviation   NECK:  no midline tenderness to palpation, no step-offs or deformities,   BACK:  no midline tenderness to palpation, no step-offs or deformities    LUNGS:  Speaking in full sentences, no acute respiratory distress   Pattern of breathing: regular,   CARDIOVASCULAR:  RRR, no murmur   ABDOMEN:  Soft, no rigidity   NEUROLOGIC:  EYE OPENING     Spontaneous - 4 [x]       To voice - 3 []       To pain - 2 []       None - 1 []    VERBAL RESPONSE     Appropriate, oriented - 5 [x]       Dazed or confused - 4 []       Syllables, expletives - 3 []       Grunts - 2 []       None - 1 []    MOTOR RESPONSE     Spontaneous, command - 6 [x]       Localizes pain - 5 []       Withdraws pain - 4 []       Abnormal flexion - 3 []       Abnormal extension - 2 []       None - 1 []            Total GCS: 15    MENTAL STATUS:  A & O x3, awake             BRAINSTEM:  Pupillary equal round and reactive to light      Cranial Nerves:    cranial nerves II-XII are grossly intact    MOTOR EXAM:    Drift:  absent  Tone:  normal    Motor exam is symmetrical 5 out of 5 all extremities bilaterally    5/5 plantar flexion of the right ankle  5/5 dorsi flexion of the right ankle  5/5 plantar flexion of the left ankle  5/5 dorsi flexion of the left ankle  5/5 flexion of the right knee  5/5 flexion of the left knee  5/5  strength on the right  5/5  strength on the left  5/5 flexion of the right elbow  5/5 flexion of the left elbow  Shoulder elevation 5/5 bilaterally - pain with movement     SENSORY:    Touch:    Right Upper Extremity:  normal  Left Upper Extremity:  normal  Right Lower Extremity:  normal  Left Lower Extremity:  normal          Coordination/Dysmetria:  Finger to Nose:   Right:  normal  Left:  normal        SKIN:  no rash      LABS AND IMAGING:     Labs:  CBC with Differential:    Lab Results   Component Value Date    WBC 11.1 01/17/2020    RBC 4.05 01/17/2020    HGB 12.0 01/17/2020    HCT 37.6 01/17/2020     01/17/2020    MCV 92.8 01/17/2020    MCH 29.6 01/17/2020    MCHC 31.9 01/17/2020    RDW 13.0 01/17/2020    LYMPHOPCT 23 01/17/2020    MONOPCT 8 01/17/2020    BASOPCT 1 01/17/2020    MONOSABS 0.92 01/17/2020    LYMPHSABS 2.52 01/17/2020    EOSABS 0.19 01/17/2020    BASOSABS 0.08 01/17/2020    DIFFTYPE NOT REPORTED 01/17/2020     BMP:    Lab Results   Component Value Date     01/17/2020    K 3.4 01/17/2020     01/17/2020    CO2 23 01/17/2020    BUN 8 01/17/2020    LABALBU 3.5 01/17/2020    CREATININE 0.69 01/17/2020    CALCIUM 8.4 01/17/2020    GFRAA >60 01/17/2020    LABGLOM >60 01/17/2020    GLUCOSE 75 01/17/2020       Radiology Review:    CT Cervical:   Anterior and posterior cervical fusion C5-6.  No acute fracture.  Hardware   appears intact         ASSESSMENT AND PLAN:       Patient Active Problem List   Diagnosis    Essential hypertension    History of colon polyps    Chest pain    Alopecia    Cervical radiculopathy    Spinal stenosis in cervical region    Cervical spondylosis       A/P:  Domenico Lazo is a 51 y.o. female who presents with cervical radiculopathy of right upper extremity after fall, previous surgical intervention on cervical spine x's 2 in 2017 by Dr. German Garnica    Patient care will be discussed with attending, will reevaluate patient along with attending     - No neurosurgical interventions planned for now  - CTLS recommendations: cervical collar in place    - CT cervical reviewed    - Will decide on need for further imaging upon re-evaluation in the am.   - Neuro checks per protocol  - Hold all antiplatelets and anticoagulants  - Pain control per primary team      Additional recommendations may follow    Please contact neurosurgery with any changes in patient's neurologic status. Thank you for your consult. Dr. Rene Puga, DO   Emergency Medicine Resident, PGY-2  Neurosurgery/Neuro Critical Care Service  2/20/2022 1:27 AM      Please note that this chart was generated using voice recognition Dragon dictation software. Although every effort was made to ensure the accuracy of this automated transcription, some errors in transcription may have occurred.

## 2022-02-20 NOTE — H&P
[address] [employer]  CDU / OBSERVATION ENCOUNTER  RESIDENT NOTE     Pt Name: Louann Covarrubias  MRN: [id number]  [place] 1975  Date of evaluation: 2/20/22  Patient's PCP is :  Brenna Ford, [facility]       Chief Complaint   Patient presents with    Ankle Pain    Neck Pain     pt states she is afraid hardware from previous surgeries was displaced         HISTORY OF PRESENT ILLNESS    Louann Covarrubias is a 51 y.o. female who presents to the emergency department yesterday evening due to a fall. Patient was leaving a bar/restaurant and slipped on ice trying to make it to her car. Fall happened around 8 PM.  She did not hit her head or lose consciousness. She does report some neck pain. She is concerned because she has had multiple prior surgeries over 3 years span. Patient is not on any blood thinners. She takes a muscle relaxant and Lyrica daily. Her last dose of muscle relaxant was 3 AM 2 days ago. Patient took her evening dose of Lyrica yesterday. She takes it due to neuralgia from her chronic neck pain. She also endorses left ankle pain. She is unaware of how she may have injured the ankle during the fall but states that the pain is in the ankle and goes up to the mid calf. He had edema and tenderness to the left lateral malleolus. Patient was able to plantar flex and dorsiflex with full strength patient was ambulating on presentation. Patient stated that her pain was a 10 out of 10. X-ray of the ankle showed edema but no acute bony abnormality. CT cervical spine showed anterior and posterior cervical fusion from C5-6 with no acute fracture. Hardware appeared intact. Patient was placed in the observation unit for further neurosurgical evaluation. On evaluation today patient states her pain is a 4 out of 10. She denies any chest pain, shortness of breath or abdominal pain.   She states that she has a slight pain in her neck but most of her pain is in her left ankle. She states that her tingling improved overnight. Location/Symptom: Left ankle  Timing/Onset: 8 PM yesterday  Provocation: Fall  Quality: Sharp  Radiation: [place] up the calf  Severity: 10 out of 10  Timing/Duration: 12 hours  Modifying Factors: Increased pain with ambulation    REVIEW OF SYSTEMS       Review of Systems   Constitutional: Positive for activity change. Negative for appetite change, chills and fever. HENT: Negative for congestion, drooling, ear pain and hearing loss. Respiratory: Negative for cough, choking, chest tightness and shortness of breath. Cardiovascular: Negative for chest pain. Musculoskeletal: Positive for joint swelling and neck pain. Neurological: Negative for dizziness, tremors, seizures, syncope, facial asymmetry, weakness, light-headedness, numbness and headaches. (PQRS) Advance directives on face sheet per hospital policy. No change unless specifically mentioned in chart    PAST MEDICAL HISTORY    has a past medical history of Anxiety, COVID-19, GERD (gastroesophageal reflux disease), and History of colon polyps. I have reviewed the past medical history with the patient and it is pertinent to this complaint. SURGICAL HISTORY      has a past surgical history that includes Hysterectomy; Appendectomy; hernia repair; Spinal fusion; Carpal tunnel release (Right, 11/02/2016); Carpal tunnel release (Left, 01/04/2017); Cervical spine surgery (07/03/2018); shoulder surgery (Right); Upper gastrointestinal endoscopy (N/A, 10/23/2019); and Colonoscopy (N/A, 10/23/2019). I have reviewed and agree with Surgical History entered and it is pertinent to this complaint.      CURRENT MEDICATIONS     pantoprazole (PROTONIX) tablet 20 mg, QAM AC  pregabalin (LYRICA) capsule 200 mg, BID  tiZANidine (ZANAFLEX) tablet 4 mg, Q8H PRN  0.9 % sodium chloride infusion, Continuous  sodium chloride flush 0.9 % injection 5-40 mL, 2 times per day  sodium chloride flush 0.9 % injection 5-40 mL, PRN  0.9 % sodium chloride infusion, PRN  potassium chloride (KLOR-CON M) extended release tablet 40 mEq, PRN   Or  potassium bicarb-citric acid (EFFER-K) effervescent tablet 40 mEq, PRN   Or  potassium chloride 10 mEq/100 mL IVPB (Peripheral Line), PRN  enoxaparin (LOVENOX) injection 40 mg, Daily  ondansetron (ZOFRAN) injection 4 mg, Q4H PRN  polyethylene glycol (GLYCOLAX) packet 17 g, Daily PRN  acetaminophen (TYLENOL) tablet 650 mg, Q6H PRN   Or  acetaminophen (TYLENOL) suppository 650 mg, Q6H PRN  diphenhydrAMINE (BENADRYL) injection 25 mg, Q6H PRN  oxyCODONE (ROXICODONE) immediate release tablet 2.5 mg, Q4H PRN        All medication charted and reviewed. ALLERGIES     is allergic to latex, penicillins, watermelon flavor, nsaids, and tolmetin. FAMILY HISTORY     She indicated that her mother is alive. She indicated that her father is alive. She indicated that her brother is alive. family history includes Dementia in her father; Parkinsonism in her brother. The patient denies any pertinent family history. I have reviewed and agree with the family history entered. I have reviewed the Family History and it is not significant to the case    SOCIAL HISTORY      reports that she has never smoked. She has never used smokeless tobacco. She reports current alcohol use. She reports that she does not use drugs. I have reviewed and agree with all Social.  There are no concerns for substance abuse/use. PHYSICAL EXAM     INITIAL VITALS:  height is 5' 2\" (1.575 m) and weight is 215 lb (97.5 kg). Her oral temperature is 98.5 °F (36.9 °C). Her blood pressure is 159/96 (abnormal) and her pulse is 73. Her respiration is 18 and oxygen saturation is 97%. Physical Exam  Constitutional:       Appearance: Normal appearance. HENT:      Head: Normocephalic and atraumatic.       Right Ear: External ear normal.      Left Ear: External ear normal.   Eyes:      Extraocular Movements: Extraocular movements intact. Neck:      Comments: Mild cervical tenderness at the midline  Cardiovascular:      Rate and Rhythm: Normal rate. Pulses: Normal pulses. Pulmonary:      Effort: Pulmonary effort is normal.      Breath sounds: Normal breath sounds. Abdominal:      Palpations: Abdomen is soft. Tenderness: There is no abdominal tenderness. Musculoskeletal:         General: Normal range of motion. Cervical back: Normal range of motion. Comments: Full range of motion, bilateral lower extremities are neurovascularly intact. Strength is intact. Neurological:      General: No focal deficit present. Mental Status: She is alert and oriented to person, place, and time. Motor: No weakness. Comments: Is 5 out of 5 in bilateral upper extremities. Sensation is intact. Psychiatric:         Mood and Affect: Mood normal.           DIFFERENTIAL DIAGNOSIS/MDM:     14-year-old female who presents with right-sided cervical radiculopathy and left-sided ankle pain after falling this evening on ice. Patient fell backwards did not hit her head and denies any loss of consciousness. X-rays and CT cervical were done in the emergency department that were unremarkable. Neurosurgery was consulted and evaluated the patient in the emergency department. They recommend CT LS precautions and reevaluation in the a.m. Patient was placed in the observation unit for neurosurgical reevaluation and possible MRI    DIAGNOSTIC RESULTS     RADIOLOGY:   I directly visualized the following  images and reviewed the radiologist interpretations:    XR ANKLE LEFT (MIN 3 VIEWS)    Result Date: 2/19/2022  EXAMINATION: THREE XRAY VIEWS OF THE LEFT ANKLE 2/19/2022 10:56 pm COMPARISON: None. HISTORY: ORDERING SYSTEM PROVIDED HISTORY: fall, lateral swelling TECHNOLOGIST PROVIDED HISTORY: fall, lateral swelling FINDINGS: The ankle mortise is intact. No fracture or dislocation is seen.   There is moderate soft tissue showed no acute abnormality. · Neurosurgical consultation, recommendations appreciated  · Pain control and icing of the left ankle  · Continue home medications and pain control  · Monitor vitals, labs, and imaging  · DISPO: pending consults and clinical improvement    CONSULTS:    IP CONSULT TO NEUROSURGERY    PROCEDURES:  Not indicated       PATIENT REFERRED TO:    Oskar Murray DO  [address] [facility] [address] [facility]  [phone]    Schedule an appointment as soon as possible for a visit in 2 days  For hospital follow-up    [address] [facility]  [phone]    In 1 week  For hospital follow-up - return to work      --  Mana Montes MD   Emergency Medicine Resident     This dictation was generated by voice recognition computer software. Although all attempts are made to edit the dictation for accuracy, there may be errors in the transcription that are not intended.

## 2022-02-20 NOTE — ED PROVIDER NOTES
[facility] ED  Emergency Department Encounter  EmergencyMedicine Resident     Pt Name:Sally Hogan  MRN: [id number]  [place] 1975  Date of evaluation: 2/19/22  PCP:  Maxwell Shelley DO    CHIEF COMPLAINT       Chief Complaint   Patient presents with    Ankle Pain    Neck Pain     pt states she is afraid hardware from previous surgeries was displaced       HISTORY OF PRESENT ILLNESS  (Location/Symptom, Timing/Onset, Context/Setting, Quality, Duration, Modifying Factors, Severity.)      Moni Clark is a 51 y.o. female who presents with neck pain and left ankle pain. She has pmhx of  WT: Weight: 215 lb (97.5 kg)    Dx: Cervical radiculopathy     Patient Active Problem List   Diagnosis    Essential hypertension    History of colon polyps    Chest pain    Alopecia    Cervical radiculopathy    Spinal stenosis in cervical region    Cervical spondylosis     PMH:  has a past medical history of Anxiety, COVID-19, GERD (gastroesophageal reflux disease), and History of colon polyps. Patient was leaving a bar/restaurant this evening and slipped on the ice prior to making into her car. The incident happened around 8pm. She states that she did not lose consciousness, hit the back of her head has internal anterior neck pain. She extensively relays that she has had multiple prior surgeries over a 3-year span on her neck. No blood thinners. Takes muscle relaxant and Lyrica every day. Last dose of muscle relaxant was 3 AM yesterday. Patient took evening dose of Lyrica today. Patient takes Lyrica for cervical neuralgia from her chronic neck pain. She relates that she is concerned hardware may have been displaced in her neck because she fell very hard on her thoracic and cervical spine. Patient does not recall landing on her buttock. She unable to relay how her left ankle was involved in the incident. She states it may have landed under her.   She has pain that is in the left ankle radiating up to the middle calf. She was seen ambulating, that did appear to be painful ambulation to her ER room. Given patient's swelling and her pain is 10 out of 10, I did have patient call family member to come to the emergency department as she will need a safe ride home after today's encounter. Patient son plans to come. I did witness patient call her son to talk to him as well. PAST MEDICAL / SURGICAL / SOCIAL / FAMILY HISTORY      has a past medical history of Anxiety, COVID-19, GERD (gastroesophageal reflux disease), and History of colon polyps. reviewed     has a past surgical history that includes Hysterectomy; Appendectomy; hernia repair; Spinal fusion; Carpal tunnel release (Right, 11/02/2016); Carpal tunnel release (Left, 01/04/2017); Cervical spine surgery (07/03/2018); shoulder surgery (Right); Upper gastrointestinal endoscopy (N/A, 10/23/2019); and Colonoscopy (N/A, 10/23/2019). reviewed    Social History     Socioeconomic History    Marital status:      Spouse name: Not on file    Number of children: Not on file    Years of education: Not on file    Highest education level: Not on file   Occupational History    Occupation:    Tobacco Use    Smoking status: Never Smoker    Smokeless tobacco: Never Used   Vaping Use    Vaping Use: Never used   Substance and Sexual Activity    Alcohol use: Yes     Comment: occ    Drug use: No    Sexual activity: Yes     Partners: Male   Other Topics Concern    Not on file   Social History Narrative    Not on file     Social Determinants of Health     Financial Resource Strain:     Difficulty of Paying Living Expenses: Not on file   Food Insecurity:     Worried About Running Out of Food in the Last Year: Not on file    Darryn of Food in the Last Year: Not on file   Transportation Needs:     Lack of Transportation (Medical): Not on file    Lack of Transportation (Non-Medical):  Not on file   Physical Activity: abdominal distention, abdominal pain, constipation, diarrhea, nausea and vomiting. Genitourinary: Negative for decreased urine volume, difficulty urinating, dysuria and menstrual problem. Musculoskeletal: Positive for arthralgias, back pain, gait problem, myalgias, neck pain and neck stiffness. Skin: Negative for rash. Allergic/Immunologic: Negative for food allergies. Neurological: Positive for headaches. Negative for speech difficulty and weakness. Tingling in right arm   Psychiatric/Behavioral: Negative for behavioral problems, confusion and self-injury. PHYSICAL EXAM   (up to 7 for level 4, 8 or more for level 5)      INITIAL VITALS:   BP (!) 129/111   Pulse 88   Temp 97.9 °F (36.6 °C) (Oral)   Resp 18   Ht 5' 2\" (1.575 m)   Wt 215 lb (97.5 kg)   SpO2 95%   BMI 39.32 kg/m²     Physical Exam  Vitals and nursing note reviewed. Constitutional:       General: She is not in acute distress. Appearance: Normal appearance. She is well-developed. She is obese. She is not ill-appearing, toxic-appearing or diaphoretic. Comments: BP (!) 129/111   Pulse 88   Temp 97.9 °F (36.6 °C) (Oral)   Resp 18   Ht 5' 2\" (1.575 m)   Wt 215 lb (97.5 kg)   SpO2 95%   BMI 39.32 kg/m²      HENT:      Head: Normocephalic and atraumatic. Right Ear: Tympanic membrane, ear canal and external ear normal. There is no impacted cerumen. Left Ear: Tympanic membrane, ear canal and external ear normal. There is no impacted cerumen. Nose: Nose normal. No congestion. Mouth/Throat:      Mouth: Mucous membranes are moist.      Pharynx: Oropharynx is clear. No oropharyngeal exudate or posterior oropharyngeal erythema. Eyes:      General: No scleral icterus. Right eye: No discharge. Left eye: No discharge. Conjunctiva/sclera: Conjunctivae normal.      Pupils: Pupils are equal, round, and reactive to light.    Cardiovascular:      Rate and Rhythm: Normal rate and regular rhythm. Pulses: Normal pulses. Heart sounds: Normal heart sounds. Pulmonary:      Effort: Pulmonary effort is normal. No respiratory distress. Breath sounds: Normal breath sounds. No wheezing. Chest:      Chest wall: No tenderness. Abdominal:      General: Abdomen is flat. Bowel sounds are normal. There is no distension. Palpations: Abdomen is soft. Tenderness: There is no abdominal tenderness. There is no right CVA tenderness or guarding. Musculoskeletal:         General: Tenderness and signs of injury present. Cervical back: Normal range of motion and neck supple. Right lower leg: No edema. Left lower leg: Tenderness present. No edema. Right ankle: Normal. No swelling, deformity, ecchymosis or lacerations. No tenderness. Normal range of motion. Anterior drawer test negative. Normal pulse. Left ankle: Swelling and ecchymosis present. No deformity or lacerations. Tenderness present over the lateral malleolus and ATF ligament. No medial malleolus, AITF ligament, CF ligament, posterior TF ligament, base of 5th metatarsal or proximal fibula tenderness. Decreased range of motion. Anterior drawer test negative. Normal pulse. Comments: Tenderness to lateral left ankle, there is swelling, and bruising. Normal Achilles bilaterally. Lymphadenopathy:      Cervical: No cervical adenopathy. Skin:     General: Skin is warm. Capillary Refill: Capillary refill takes less than 2 seconds. Findings: No rash. Neurological:      General: No focal deficit present. Mental Status: She is alert. DIFFERENTIAL  DIAGNOSIS     PLAN (LABS / IMAGING / EKG):  Orders Placed This Encounter   Procedures    COVID-19, Rapid    CT CERVICAL SPINE WO CONTRAST    XR ANKLE LEFT (MIN 3 VIEWS)    ADULT DIET;  Regular    Ice to affected area    Cervical collar    Vital signs per unit routine    Telemetry monitoring - 72 hour duration    Notify physician    Up as tolerated    Place intermittent pneumatic compression device    Full Code    Inpatient consult to Neurosurgery    OT eval and treat    PT evaluation and treat    Initiate Oxygen Therapy Protocol    Place in Observation Service       MEDICATIONS ORDERED:  Orders Placed This Encounter   Medications    oxyCODONE-acetaminophen (PERCOCET) 5-325 MG per tablet 2 tablet    pantoprazole (PROTONIX) tablet 20 mg    pregabalin (LYRICA) capsule 200 mg    tiZANidine (ZANAFLEX) tablet 4 mg    0.9 % sodium chloride infusion    sodium chloride flush 0.9 % injection 5-40 mL    sodium chloride flush 0.9 % injection 5-40 mL    0.9 % sodium chloride infusion    OR Linked Order Group     potassium chloride (KLOR-CON M) extended release tablet 40 mEq     potassium bicarb-citric acid (EFFER-K) effervescent tablet 40 mEq     potassium chloride 10 mEq/100 mL IVPB (Peripheral Line)    enoxaparin (LOVENOX) injection 40 mg    ondansetron (ZOFRAN) injection 4 mg    polyethylene glycol (GLYCOLAX) packet 17 g    OR Linked Order Group     acetaminophen (TYLENOL) tablet 650 mg     acetaminophen (TYLENOL) suppository 650 mg    prochlorperazine (COMPAZINE) injection 10 mg    diphenhydrAMINE (BENADRYL) injection 25 mg    oxyCODONE (ROXICODONE) immediate release tablet 2.5 mg       DDX: c-spine fx, c-spine dislocation, ankle L fracture, L ankle sprain, L ankle dislocation. DIAGNOSTIC RESULTS / EMERGENCY DEPARTMENT COURSE / MDM   LAB RESULTS:  Results for orders placed or performed during the hospital encounter of 02/19/22   COVID-19, Rapid    Specimen: Nasopharyngeal Swab   Result Value Ref Range    Specimen Description . NASOPHARYNGEAL SWAB     SARS-CoV-2, Rapid Not Detected Not Detected       IMPRESSION:     RADIOLOGY:  CT CERVICAL SPINE WO CONTRAST   Final Result   Anterior and posterior cervical fusion C5-6. No acute fracture.   Hardware   appears intact         XR ANKLE LEFT (MIN 3 VIEWS)   Final Result   Moderate soft tissue swelling laterally with no acute bony abnormality. EMERGENCY DEPARTMENT COURSE:  ED Course as of 02/21/22 5274   Sat Feb 19, 2022 2239 Spoke with patients son during encounter her plans to come and drive patient home as I plan to relieve patiets pain with percocet - significant ankle swelling s/p fall with cervical spine pain as well. Will image c-spine. [LL]   1852 CT CERVICAL SPINE WO CONTRAST [LL]   1846 No acute findings on imaging. Plan for discharge with close follow up. And strict precautions. [LL]      ED Course User Index  [LL] Tri Berg MD     Patient placed in a c-collar, consult neurosurgery in the emergency department   patient admitted to observation unit for evaluation and neurosurgical re-evaluation in the morning with possible consideration of MRI. CONSULTS:  IP CONSULT TO NEUROSURGERY    CRITICAL CARE:  Please see attending note    FINAL IMPRESSION      1. Acute left ankle pain    2. Cervical radiculopathy    3.  Sprain of left ankle, unspecified ligament, initial encounter          DISPOSITION / PLAN     DISPOSITION        PATIENT REFERRED TO:  Nena Stephens, DO  [address] [facility] [address] [facility]  [phone]    Schedule an appointment as soon as possible for a visit in 2 days  For hospital follow-up    [place] [address] [facility]  [phone]    In 1 week  For hospital follow-up - return to work      DISCHARGE MEDICATIONS:  New Prescriptions    No medications on file       Tri Berg MD  Emergency Medicine Resident    (Please note that portions of thisnote were completed with a voice recognition program.  Efforts were made to edit the dictations but occasionally words are mis-transcribed.)        Tri Berg MD  Resident  02/21/22 2264

## 2022-02-20 NOTE — PROGRESS NOTES
[email or web address]  CDU / OBSERVATION ENCOUNTER  ATTENDING NOTE       I performed a history and physical examination of the patient and discussed management with the resident or midlevel provider. I reviewed the resident or midlevel provider's note and agree with the documented findings and plan of care. Any areas of disagreement are noted on the chart. I was personally present for the key portions of any procedures. I have documented in the chart those procedures where I was not present during the key portions. I have reviewed the nurses notes. I agree with the chief complaint, past medical history, past surgical history, allergies, medications, social and family history as documented unless otherwise noted below. The Family history, social history, and ROS are effectively unchanged since admission unless noted elsewhere in the chart. Patient improved and asking for discharge. Patient has been evaluated by attending neurosurgeon. Patient with imaging showing good anatomy. Patient ready for home with follow-up as outpatient.     Jarek Nunez MD  Attending Emergency  Physician

## 2022-02-20 NOTE — FLOWSHEET NOTE
Lorne Piña on ice approximately 3-4 hours ago.  Pain to left ankle and Upper Spine   Hx: spine fusions C-6

## 2022-02-20 NOTE — ED NOTES
Lyrica requested from main pharmacy at this time. C-Collar at bedside patient states her head hurts and doesn't want to wear it at this time. Patient educated on reason for C-Collar.        Michelle Leyva RN  02/20/22 Annie Pérez RN  02/20/22 [id number]

## 2022-02-20 NOTE — CARE COORDINATION
Case Management Initial Discharge Plan  Sally Stein,             Met with:patient to discuss discharge plans. Information verified: address, contacts, phone number, , insurance Yes  Insurance Provider: Lana Wilson    Emergency Contact/Next of Kin name & number:   1. Dago Stein  2. Valerie Mccarty      Spouse  Parent [phone]       Who are involved in patient's support system? family    PCP: Dung Dewey DO  Date of last visit: 3 months      Discharge Planning    Living Arrangements:  Family Members     Home has 2 stories  1 stairs to climb to get into front door, 6stairs to climb to reach second floor  Location of bedroom/bathroom in home main    Patient able to perform ADL's:Independent    Current Services (outpatient & in home) none  DME equipment: none  DME provider: none    Is patient receiving oral anticoagulation therapy? No    If indicated:   Physician managing anticoagulation treatment: n/a  Where does patient obtain lab work for ATC treatment? n/a      Potential Assistance Needed:  N/A    Patient agreeable to home care: No  [place] of choice provided:  n/a    Prior SNF/Rehab Placement and Facility: none  Agreeable to SNF/Rehab: No  [place] of choice provided: n/a     Evaluation: no    Expected Discharge date:  22    Patient expects to be discharged to: If home: is the family and/or caregiver wiling & able to provide support at home? yes  Who will be providing this support?     Follow Up Appointment: Best Day/ Time:      Transportation provider: family  Transportation arrangements needed for discharge: No    Readmission Risk              Risk of Unplanned Readmission:  0             Does patient have a readmission risk score greater than 14?: n/a  If yes, follow-up appointment must be made within 7 days of discharge.      Goals of Care: to go home      Educated pt on transitional options, provided freedom of choice and are agreeable with plan      Discharge Plan: Home independently,pcp established, has transportation          Electronically signed by Kerry Tirado RN on 2/20/22 at 10:49 AM EST

## 2022-02-20 NOTE — FLOWSHEET NOTE
Assessment: Patient is a 51 y.o. female who arrived to ED8 due to a \"fall. \" Per report, patient \"slipped on ice outside of a restaurant and hit the back of her head. \" Patient was sitting up in hospital bed, when  visited. Intervention:  visited patient per initial rounding visits in the ED.  introduced herself to patient and learned about her well-being. Patient indicated that she remains in pain. Patient expressed desire to be transfered to the floor, as she is experiencing discomfort in the hospital bed. Patient shared that her spouse was present with her earlier. Patient thanked  for visit and support. Outcome: Patient was receptive of 's visit and support. Plan: Chaplains can make follow-up visit, per request. Patti Rebolledo can be reached 24/7 via [employer]. Tanesha Pabon     02/20/22 0333   Encounter Summary   Services provided to: Patient   Referral/Consult From: Rounding  (ED Rounding)   Support System Spouse   Continue Visiting   (2/19/2022)   Complexity of Encounter Low   Length of Encounter 15 minutes   Spiritual Assessment Completed Yes   Routine   Type Initial   Spiritual/Scientologist   Type Spiritual support   Assessment Calm; Approachable;Helplessness   Intervention Active listening;Explored feelings, thoughts, concerns;Explored coping resources;Nurtured hope;Sustaining presence/ Ministry of presence; Discussed illness/injury and it's impact   Outcome Comfort;Expressed gratitude;Coping;Receptive

## 2022-02-20 NOTE — ED NOTES
Patient resting in bed at this time with eyes closed. Pillow given and patient repositioned in bed. Patient states she is in pain. Admitting messaged for pain medication at this time.       Brionna Wilson RN  02/20/22 1137

## 2022-02-20 NOTE — DISCHARGE SUMMARY
CDU Discharge Summary        Patient:  Rosalino Linn  YOB: 1975    MRN: [id number]   Acct: [de-identified]    Primary Care Physician: Omar Travis DO    Admit date:  2/19/2022 10:13 PM  Discharge date: 2/20/2022    Discharge Diagnoses:     1.) Cervical radiculopathy status post cervical fusion secondary to mechanical fall. Patient with resolution of symptoms with rest and analgesics. Evaluated by neurosurgery. 2. Mechanical fall resulting in multiple contusions. Patient treated with rest and analgesia. Patient for outpatient follow-up. Follow-up:  Call today/tomorrow for a follow up appointment with Omar Travis DO , or return to the Emergency Room with worsening symptoms    Stressed to patient the importance of following up with primary care doctor for further workup/management of symptoms. Pt verbalizes understanding and agrees with plan. Discharge Medication Changes:       Medication List      START taking these medications    oxyCODONE 5 MG immediate release tablet  Commonly known as: ROXICODONE  Take 0.5 tablets by mouth every 8 hours as needed for Pain for up to 5 days. CONTINUE taking these medications    lansoprazole 30 MG delayed release capsule  Commonly known as: PREVACID  TAKE ONE CAPSULE BY MOUTH DAILY     Lyrica 200 MG capsule  Generic drug: pregabalin     tiZANidine 4 MG tablet  Commonly known as: Andre Freed           Where to Get Your Medications      You can get these medications from any pharmacy    Bring a paper prescription for each of these medications  · oxyCODONE 5 MG immediate release tablet         Diet:  ADULT DIET;  Regular, advance as tolerated     Activity:  As tolerated    Consultants: IP CONSULT TO NEUROSURGERY    Procedures:  Not indicated      Diagnostic Test:   Results for orders placed or performed during the hospital encounter of 02/19/22   COVID-19, Rapid    Specimen: Nasopharyngeal Swab   Result Value Ref Range    Specimen Description Iris Jacobson NASOPHARYNGEAL SWAB     SARS-CoV-2, Rapid Not Detected Not Detected     XR ANKLE LEFT (MIN 3 VIEWS)    Result Date: 2/19/2022  EXAMINATION: THREE XRAY VIEWS OF THE LEFT ANKLE 2/19/2022 10:56 pm COMPARISON: None. HISTORY: ORDERING SYSTEM PROVIDED HISTORY: fall, lateral swelling TECHNOLOGIST PROVIDED HISTORY: fall, lateral swelling FINDINGS: The ankle mortise is intact. No fracture or dislocation is seen. There is moderate soft tissue swelling laterally. Moderate soft tissue swelling laterally with no acute bony abnormality. CT CERVICAL SPINE WO CONTRAST    Result Date: 2/19/2022  EXAMINATION: CT OF THE CERVICAL SPINE WITHOUT CONTRAST 2/19/2022 11:28 pm TECHNIQUE: CT of the cervical spine was performed without the administration of intravenous contrast. Multiplanar reformatted images are provided for review. Dose modulation, iterative reconstruction, and/or weight based adjustment of the mA/kV was utilized to reduce the radiation dose to as low as reasonably achievable. COMPARISON: None. HISTORY: ORDERING SYSTEM PROVIDED HISTORY: fall today, multiple prior surgeries/traumas TECHNOLOGIST PROVIDED HISTORY: fall today, multiple prior surgeries/traumas Decision Support Exception - unselect if not a suspected or confirmed emergency medical condition->Emergency Medical Condition (MA) Is the patient pregnant?->No Reason for Exam: FALL,NECK PAIN FINDINGS: BONES/ALIGNMENT: There is no acute fracture or traumatic malalignment. Anterior plate and screws with disc prosthesis and posterior interbody fusion rods and pedicular screws C5-6. Central canal and neural foramina patent. DEGENERATIVE CHANGES: No significant degenerative changes. SOFT TISSUES: There is no prevertebral soft tissue swelling. Anterior and posterior cervical fusion C5-6. No acute fracture.   Hardware appears intact           Physical Exam:    General appearance - NAD, AOx 3    Lungs -CTAB, no R/R/R  Heart - RRR, no M/R/G  Abdomen - Soft, NT/ND  Neurological:  MAEx4, No focal motor deficit, sensory loss  Extremities - Cap refil <2 sec in all ext., no edema, ambulatory with Ace wrap on ankle. Skin -warm, dry      Hospital Course:  Clinical course has improved, labs and imaging reviewed. Steven Craig originally presented to the hospital on 2/19/2022 10:13 PM with mechanical fall status post cervical fusion. At that time it was determined that She required further observation and neurosurgical evaluation. Patient had CT scanning which showed no displacement of hardware or fracture. Patient was kept for attending evaluation the next day by neurosurgery. Patient was cleared after neurosurgical attending saw patient. Patient was up and ambulatory, looking and feeling well and ready for discharge. . Labs and imaging were followed daily. Imaging results as above. She is medically stable to be discharged. Disposition: Home    Patient stated that they will not drive themselves home from the hospital if they have gotten pain killers/ narcotics earlier that day and that they will arrange for transportation on their own or work with the  for a ride. Patient counseled NOT to drive while under the influence of narcotics/ pain killers. Condition: Good    Patient stable and ready for discharge home. I have discussed plan of care with patient and they are in understanding. They were instructed to read discharge paperwork. All of their questions and concerns were addressed. Time Spent: 0 day      --  Benitez Maynard MD  Emergency Medicine Attending Physician    This dictation was generated by voice recognition computer software. Although all attempts are made to edit the dictation for accuracy, there may be errors in the transcription that are not intended.

## 2022-02-20 NOTE — ED PROVIDER NOTES
[facility]     Emergency Department     Faculty Attestation    I performed a history and physical examination of the patient and discussed management with the resident. I have reviewed and agree with the residents findings including all diagnostic interpretations, and treatment plans as written. Any areas of disagreement are noted on the chart. I was personally present for the key portions of any procedures. I have documented in the chart those procedures where I was not present during the key portions. I have reviewed the emergency nurses triage note. I agree with the chief complaint, past medical history, past surgical history, allergies, medications, social and family history as documented unless otherwise noted below. Documentation of the HPI, Physical Exam and Medical Decision Making performed by marta is based on my personal performance of the HPI, PE and MDM. For Physician Assistant/ Nurse Practitioner cases/documentation I have personally evaluated this patient and have completed at least one if not all key elements of the E/M (history, physical exam, and MDM). Additional findings are as noted. Patient status post slip and fall. Twisting her left ankle and then falling back and hitting her head. No loss of consciousness. She is status post a anterior posterior cervical fusion done by Dr. Concetta Eagle in 2017, reports no numbness or tingling since shortly after her last surgery. Today she is having neck pain, and some tingling in her right thumb index and middle finger. Which she has not had since prior to her last surgery 5 years ago. No weakness. Also complaining of left ankle pain. Patient on exam has edema to the left lateral malleolus. With tenderness to the lateral malleolus. She is able to plantarflex and dorsiflex with 5 out of 5 motor strength.   No pain to the medial malleolus, no pain to the proximal fibular head she is able to flex and extend fully at the knee. Patient midline cervical tenderness palpation. She has 5 out of 5 upper extremity motor strength. But decreased sensation in her thumb index and middle finger to her right hand. Patient with ankle sprain we will plan on x-ray imaging. Also neck pain status post fall. We will plan on CT imaging. Patient does have some tingling and sensation loss. Consider neurosurgical consultation.     Adira Ruiz D.O, M.P.H  Attending Emergency Medicine Physician         Adria Ruiz DO  02/20/22 [id number]

## 2022-05-18 ENCOUNTER — HOSPITAL ENCOUNTER (OUTPATIENT)
Age: 47
Setting detail: SPECIMEN
Discharge: HOME | End: 2022-05-18

## 2022-05-18 DIAGNOSIS — N89.8 VAGINAL DISCHARGE: ICD-10-CM

## 2022-05-19 LAB
CANDIDA SPECIES, DNA PROBE: NEGATIVE
GARDNERELLA VAGINALIS, DNA PROBE: NEGATIVE
SOURCE: NORMAL
TRICHOMONAS VAGINALIS DNA: NEGATIVE

## 2022-07-26 ENCOUNTER — HOSPITAL ENCOUNTER (OUTPATIENT)
Age: 47
Discharge: HOME | End: 2022-07-26
Payer: COMMERCIAL

## 2022-07-26 ENCOUNTER — OFFICE VISIT (OUTPATIENT)
Dept: BARIATRICS/WEIGHT MGMT | Age: 47
End: 2022-07-26
Payer: COMMERCIAL

## 2022-07-26 VITALS
DIASTOLIC BLOOD PRESSURE: 73 MMHG | HEIGHT: 62 IN | HEART RATE: 101 BPM | BODY MASS INDEX: 39.93 KG/M2 | WEIGHT: 217 LBS | SYSTOLIC BLOOD PRESSURE: 111 MMHG

## 2022-07-26 DIAGNOSIS — E66.9 OBESITY (BMI 30-39.9): ICD-10-CM

## 2022-07-26 DIAGNOSIS — M54.12 CERVICAL RADICULOPATHY: ICD-10-CM

## 2022-07-26 DIAGNOSIS — K21.9 GASTROESOPHAGEAL REFLUX DISEASE, UNSPECIFIED WHETHER ESOPHAGITIS PRESENT: ICD-10-CM

## 2022-07-26 DIAGNOSIS — M19.90 ARTHRITIS: ICD-10-CM

## 2022-07-26 DIAGNOSIS — I10 ESSENTIAL HYPERTENSION: Primary | ICD-10-CM

## 2022-07-26 PROBLEM — E66.01 MORBID OBESITY WITH BMI OF 40.0-44.9, ADULT (HCC): Status: ACTIVE | Noted: 2022-07-26

## 2022-07-26 PROCEDURE — 99204 OFFICE O/P NEW MOD 45 MIN: CPT | Performed by: NURSE PRACTITIONER

## 2022-07-26 PROCEDURE — 93005 ELECTROCARDIOGRAM TRACING: CPT | Performed by: NURSE PRACTITIONER

## 2022-07-26 NOTE — PROGRESS NOTES
Clinical Induction for Group Lifestyle Balance Program Progress Note    Subjective     The patient is a 49 y.o. female being seen regarding supervised weight loss. The patient's PCP is Allie Guzman DO . Highest adult weight was 245 lbs and lowest adult weight was 150 lbs. Her Body mass index is 39.69 kg/m². Emma Castillogolden Her weight goal is 175 lbs. The patient reports that her obesity is significantly affecting her life on a daily basis. Weight Loss Program History:   She has tried Weight Watchers, NutriSystem, Cabbage Soup, Keto/Paleo, and Intermittent Fasting diets, as well as, Adipex and Adderral.  These attempts have been somewhat successful with weight loss, but have failed to sustain adequate weight loss. The patient has also tried self directed diet and exercise in attempts to sustain weight loss. Comorbid Conditions:  Significant diseases affecting this patient are: HTN, Chronic Neck Pain, GERD, Arthritis      Allergies: Allergies   Allergen Reactions    Latex Other (See Comments)     Turns bright red  Turns bright red    Penicillins Hives and Shortness Of Breath    Watermelon Flavor Anaphylaxis     Any kind of melon     Nsaids Swelling     Face swells and feels flushed. Tolmetin Swelling     Face swells and feels flushed. Past Medical History:     Past Medical History:   Diagnosis Date    Anxiety     Arthritis of spine     COVID-19 11/30/2020    Tested at [facility] - anosmia/ageusia     GERD (gastroesophageal reflux disease)     History of colon polyps 10/23/2019    tubular adenoma    Hypertension    .     Past Surgical History:  Past Surgical History:   Procedure Laterality Date    APPENDECTOMY      CARPAL TUNNEL RELEASE Right 11/02/2016    Dr. Chirag Denis Left 01/04/2017    Dr Robby Nicole  07/03/2018    C5-6     COLONOSCOPY N/A 10/23/2019    COLONOSCOPY WITH BIOPSY performed by Maulik Molina MD at [facility] capsule Take 200 mg by mouth 2 times daily. tiZANidine (ZANAFLEX) 4 MG tablet 4 mg every 8 hours as needed        No current facility-administered medications for this visit. Vital Signs:  /73 (Site: Right Upper Arm, Position: Sitting, Cuff Size: Large Adult)   Pulse (!) 101   Ht 5' 2\" (1.575 m)   Wt 217 lb (98.4 kg)   BMI 39.69 kg/m²     BMI/Height/Weight:  Body mass index is 39.69 kg/m². Waist Circumference 49\"          Review of Systems - A review of systems was performed. All was negative unless otherwise documented in HPI. Constitutional: Negative for fever, chills. HENT: Negative for trouble swallowing. Eyes: Negative for visual disturbance. Respiratory: Negative for cough, shortness of breath. Cardiovascular: Negative for chest pain and palpitations. Gastrointestinal: Negative for nausea, vomiting, abdominal pain, diarrhea, constipation, blood in stool and abdominal distention. Endocrine: Negative for polydipsia, polyphagia and polyuria. Genitourinary: Negative for dysuria, frequency, hematuria and difficulty urinating. Musculoskeletal: Negative for myalgias, joint swelling. Skin: Negative for pallor and rash. Neurological: Negative for dizziness, tremors, light-headedness and headaches. Psychiatric/Behavioral: Negative for sleep disturbance and dysphoric mood. Objective:      Physical Exam   Vital signs reviewed. General: Well-developed and well-nourished. No acute distress. Skin: Warm, dry and intact. HEENT: Normocephalic. EOMs intact. Conjunctivae normal. Neck supple. Cardiovascular: Normal rate, regular rhythm. Pulmonary/Chest: Normal effort. Lungs clear to auscultation. No rales, rhonchi or wheezing. Abdominal: Positive bowel sounds. Soft, nontender. Nondistended. Musculoskeletal: Movement x4. No edema. Neurological: Gait normal. Alert and oriented to person, place, and time. Psychiatric: Normal mood and affect.  Speech and behavior normal. Judgment and thought content normal. Cognition and memory intact. Assessment:       Diagnosis Orders   1. Essential hypertension  EKG 12 Lead      2. Cervical radiculopathy  EKG 12 Lead      3. Gastroesophageal reflux disease, unspecified whether esophagitis present  EKG 12 Lead      4. Arthritis  EKG 12 Lead      5. Obesity (BMI 30-39. 9)            Plan:      Class schedule reviewed. Consent and confidentiality agreement discussed. Patient aware group medical appointment is voluntary and individual appointments are available as desired. [x] EKG ordered. [x] Previous labs reviewed and acceptable. [] Diabetic teaching done. Low blood sugar protocol reviewed with pt.      [] Discussed targets and management of blood sugar, Hgb A1C, lipids, and blood pressure. [] Reviewed medications and discussed potential need for adjustments while following the program and losing weight.     [] Smoking cessation discussed. [] Avoidance of alcohol discussed. [x] Specific questions answered. Follow up:  Return in about 3 weeks (around 8/16/2022). This encounters orders:  Orders Placed This Encounter   Procedures    EKG 12 Lead     Standing Status:   Future     Standing Expiration Date:   7/26/2023     Order Specific Question:   Reason for Exam?     Answer:   Pre-op       This encounters prescriptions:  No orders of the defined types were placed in this encounter.       Electronically signed by:  Suzy Sawyer CNP

## 2022-07-28 LAB
EKG ATRIAL RATE: 94 BPM
EKG P AXIS: 68 DEGREES
EKG P-R INTERVAL: 140 MS
EKG Q-T INTERVAL: 366 MS
EKG QRS DURATION: 88 MS
EKG QTC CALCULATION (BAZETT): 457 MS
EKG R AXIS: 59 DEGREES
EKG T AXIS: 59 DEGREES
EKG VENTRICULAR RATE: 94 BPM

## 2022-08-16 ENCOUNTER — OFFICE VISIT (OUTPATIENT)
Dept: BARIATRICS/WEIGHT MGMT | Age: 47
End: 2022-08-16
Payer: COMMERCIAL

## 2022-08-16 VITALS
BODY MASS INDEX: 40.12 KG/M2 | SYSTOLIC BLOOD PRESSURE: 125 MMHG | HEIGHT: 62 IN | DIASTOLIC BLOOD PRESSURE: 89 MMHG | HEART RATE: 102 BPM | WEIGHT: 218 LBS

## 2022-08-16 DIAGNOSIS — K21.9 GASTROESOPHAGEAL REFLUX DISEASE, UNSPECIFIED WHETHER ESOPHAGITIS PRESENT: ICD-10-CM

## 2022-08-16 DIAGNOSIS — M19.90 ARTHRITIS: ICD-10-CM

## 2022-08-16 DIAGNOSIS — E66.9 OBESITY (BMI 30-39.9): ICD-10-CM

## 2022-08-16 DIAGNOSIS — I10 ESSENTIAL HYPERTENSION: Primary | ICD-10-CM

## 2022-08-16 DIAGNOSIS — M54.12 CERVICAL RADICULOPATHY: ICD-10-CM

## 2022-08-16 PROCEDURE — 99213 OFFICE O/P EST LOW 20 MIN: CPT | Performed by: NURSE PRACTITIONER

## 2022-08-16 NOTE — PROGRESS NOTES
Occupational History    Occupation:    Tobacco Use    Smoking status: Never    Smokeless tobacco: Never   Vaping Use    Vaping Use: Never used   Substance and Sexual Activity    Alcohol use: Yes     Comment: occ    Drug use: No    Sexual activity: Yes     Partners: Male   Other Topics Concern    Not on file   Social History Narrative    Not on file     Social Determinants of Health     Financial Resource Strain: Low Risk     Difficulty of Paying Living Expenses: Not hard at all   Food Insecurity: No Food Insecurity    Worried About Running Out of Food in the Last Year: Never true    Ran Out of Food in the Last Year: Never true   Transportation Needs: Not on file   Physical Activity: Not on file   Stress: Not on file   Social Connections: Not on file   Intimate Partner Violence: Not on file   Housing Stability: Not on file       Current Medications:  Current Outpatient Medications   Medication Sig Dispense Refill    hydroCHLOROthiazide (HYDRODIURIL) 25 MG tablet TAKE ONE TABLET BY MOUTH EVERY MORNING 30 tablet 2    traZODone (DESYREL) 50 MG tablet Take 1 tablet by mouth nightly as needed for Sleep 30 tablet 0    celecoxib (CELEBREX) 100 MG capsule Celebrex 100 mg capsule   Take 1 capsule every day by oral route. lansoprazole (PREVACID) 30 MG delayed release capsule TAKE ONE CAPSULE BY MOUTH DAILY 90 capsule 2    LYRICA 200 MG capsule Take 200 mg by mouth 2 times daily. tiZANidine (ZANAFLEX) 4 MG tablet 4 mg every 8 hours as needed        No current facility-administered medications for this visit. Vital Signs:  /89 (Site: Right Upper Arm, Position: Sitting, Cuff Size: Large Adult)   Pulse (!) 102 Comment: slightly irregular, asymptomatic  Ht 5' 2\" (1.575 m)   Wt 218 lb (98.9 kg)   BMI 39.87 kg/m²     BMI/Height/Weight:  Body mass index is 39.87 kg/m². Review of Systems  Constitutional: Weight gain      Objective:      Physical Exam   Vital signs reviewed.   General Appearance: Well-developed and well-nourished. No acute distress. Skin: Warm, dry. Head: Normocephalic. Pulmonary/Chest: Normal respiratory effort. Musculoskeletal: Movement x4. Neurological:  Alert and oriented. Individual goal for this encounter:  Would like to get off of blood pressure medication and feel healthier. Feel good in my skin. [x] Vital signs reviewed and discussed with patient. [x] Labs/EKG reviewed and discussed with patient. [] Blood sugar log reviewed and discussed with patient. [] Physical activity discussed. [] Medication changes recommended. [] Smoking cessation discussed. [x] Specific questions/concerns addressed. Specific group medical question(s) addressed in this encounter:  Metabolic Syndrome      Group discussion topic for this encounter:     [x] Welcome Jumpstart   [] Be a Fat & Calorie    [] Healthy Eating   [] Move Those Muscles   [] Tip the Calorie Balance   [] Take charge of What's Around You   [] Problem Solving   [] Step Up Your Physical Activity Plan   [] Manage Slips and Self-Defeating Thoughts   [] [place] To Healthy Eating Out   [] Make Social Cues Work for August Parrish   [] Ways to Stay Motivated   [] Preparing for Long Term Self-Management   [] Take Charge of Your Lifestyle   [] Mindful Eating, Mindful Movement   [] Manage Your Stress   [] Sit Less for Health   [] More Volume, Fewer Calories   [] Stay Active   [] Balance Your Thoughts   [] Heart Health    [] Looking Back and Looking Forward    Total time:  90 minutes, greater than 50% of visit spent in group counseling/education. 20 minutes were spent in reviewing chart, vital signs, labs, and answering the patient's individual questions. Assessment:       Diagnosis Orders   1. Essential hypertension        2. Gastroesophageal reflux disease, unspecified whether esophagitis present        3. Obesity (BMI 30-39.9)        4. Arthritis        5.  Cervical radiculopathy          Pulse initially 114, recheck 102, slightly irregular. Asymptomatic. Will monitor. Plan:      Track food and weight. Return to clinic as per group medical appointment schedule. Follow-up:  Return in about 1 week (around 8/23/2022). Orders:  No orders of the defined types were placed in this encounter. Prescriptions:  No orders of the defined types were placed in this encounter.       Electronically signed by:  Fly Lieberman CNP

## 2022-08-23 ENCOUNTER — OFFICE VISIT (OUTPATIENT)
Dept: BARIATRICS/WEIGHT MGMT | Age: 47
End: 2022-08-23
Payer: COMMERCIAL

## 2022-08-23 VITALS
HEART RATE: 100 BPM | DIASTOLIC BLOOD PRESSURE: 84 MMHG | BODY MASS INDEX: 40.12 KG/M2 | SYSTOLIC BLOOD PRESSURE: 120 MMHG | WEIGHT: 218 LBS | HEIGHT: 62 IN

## 2022-08-23 DIAGNOSIS — I10 ESSENTIAL HYPERTENSION: Primary | ICD-10-CM

## 2022-08-23 DIAGNOSIS — M19.90 ARTHRITIS: ICD-10-CM

## 2022-08-23 DIAGNOSIS — K21.9 GASTROESOPHAGEAL REFLUX DISEASE, UNSPECIFIED WHETHER ESOPHAGITIS PRESENT: ICD-10-CM

## 2022-08-23 DIAGNOSIS — E66.9 OBESITY (BMI 30-39.9): ICD-10-CM

## 2022-08-23 DIAGNOSIS — M54.12 CERVICAL RADICULOPATHY: ICD-10-CM

## 2022-08-23 PROCEDURE — 99213 OFFICE O/P EST LOW 20 MIN: CPT | Performed by: NURSE PRACTITIONER

## 2022-08-23 NOTE — PROGRESS NOTES
Group Lifestyle Balance Follow-up Progress Note      Subjective     Patient is here for group medical appointment for Group Lifestyle Balance weight management program follow-up for the chronic conditions of HTN, Chronic Neck Pain, GERD, Arthritis. Patient continues on the program and tolerating well. Total weight gain of 1 lb to date. No current issues. Allergies: Allergies   Allergen Reactions    Latex Other (See Comments)     Turns bright red  Turns bright red    Penicillins Hives and Shortness Of Breath    Watermelon Flavor Anaphylaxis     Any kind of melon     Nsaids Swelling     Face swells and feels flushed. Tolmetin Swelling     Face swells and feels flushed. Past Medical History:     Past Medical History:   Diagnosis Date    Anxiety     Arthritis of spine     COVID-19 11/30/2020    Tested at [facility] - anosmia/ageusia     GERD (gastroesophageal reflux disease)     History of colon polyps 10/23/2019    tubular adenoma    Hypertension    .     Past Surgical History:  Past Surgical History:   Procedure Laterality Date    APPENDECTOMY      CARPAL TUNNEL RELEASE Right 11/02/2016    Dr. Santiago Left 01/04/2017    Dr Hilda Sadler  07/03/2018    C5-6     COLONOSCOPY N/A 10/23/2019    COLONOSCOPY WITH BIOPSY performed by Joshua Moore MD at [address] [place] [address] (CERVIX STATUS UNKNOWN)      SHOULDER SURGERY Right     SPINAL FUSION      UPPER GASTROINTESTINAL ENDOSCOPY N/A 10/23/2019    EGD BIOPSY performed by Joshua Moore MD at [facility]       Family History:  Family History   Problem Relation Age of Onset    Dementia Father     Parkinsonism Brother     Diabetes Maternal Grandfather        Social History:  Social History     Socioeconomic History    Marital status:      Spouse name: Not on file    Number of children: Not on file    Years of education: Not on file    Highest education level: Not on file Occupational History    Occupation:    Tobacco Use    Smoking status: Never    Smokeless tobacco: Never   Vaping Use    Vaping Use: Never used   Substance and Sexual Activity    Alcohol use: Yes     Comment: occ    Drug use: No    Sexual activity: Yes     Partners: Male   Other Topics Concern    Not on file   Social History Narrative    Not on file     Social Determinants of Health     Financial Resource Strain: Low Risk     Difficulty of Paying Living Expenses: Not hard at all   Food Insecurity: No Food Insecurity    Worried About Running Out of Food in the Last Year: Never true    Ran Out of Food in the Last Year: Never true   Transportation Needs: Not on file   Physical Activity: Not on file   Stress: Not on file   Social Connections: Not on file   Intimate Partner Violence: Not on file   Housing Stability: Not on file       Current Medications:  Current Outpatient Medications   Medication Sig Dispense Refill    hydroCHLOROthiazide (HYDRODIURIL) 25 MG tablet TAKE ONE TABLET BY MOUTH EVERY MORNING 30 tablet 2    traZODone (DESYREL) 50 MG tablet Take 1 tablet by mouth nightly as needed for Sleep 30 tablet 0    celecoxib (CELEBREX) 100 MG capsule Celebrex 100 mg capsule   Take 1 capsule every day by oral route. lansoprazole (PREVACID) 30 MG delayed release capsule TAKE ONE CAPSULE BY MOUTH DAILY 90 capsule 2    LYRICA 200 MG capsule Take 200 mg by mouth 2 times daily. tiZANidine (ZANAFLEX) 4 MG tablet 4 mg every 8 hours as needed        No current facility-administered medications for this visit. Vital Signs:  /84 (Site: Right Upper Arm, Position: Sitting, Cuff Size: Large Adult)   Pulse 100   Ht 5' 2\" (1.575 m)   Wt 218 lb (98.9 kg)   BMI 39.87 kg/m²     BMI/Height/Weight:  Body mass index is 39.87 kg/m². Review of Systems  Constitutional: Weight gain      Objective:      Physical Exam   Vital signs reviewed. General Appearance: Well-developed and well-nourished.  No acute distress. Skin: Warm, dry. Head: Normocephalic. Pulmonary/Chest: Normal respiratory effort. Musculoskeletal: Movement x4. Neurological:  Alert and oriented. Individual goal for this encounter:  Would like to get off of blood pressure medication and feel healthier. Feel good in my skin. [x] Vital signs reviewed and discussed with patient. [x] Labs/EKG reviewed and discussed with patient. [] Blood sugar log reviewed and discussed with patient. [] Physical activity discussed. [] Medication changes recommended. [] Smoking cessation discussed. [x] Specific questions/concerns addressed. Specific group medical question(s) addressed in this encounter:  Fatty Liver      Group discussion topic for this encounter:     [] Welcome Jumpstart   [x] Be a Fat & Calorie    [] Healthy Eating   [] Move Those Muscles   [] Tip the Calorie Balance   [] Take charge of What's Around You   [] Problem Solving   [] Step Up Your Physical Activity Plan   [] Manage Slips and Self-Defeating Thoughts   [] [place] To Healthy Eating Out   [] Make Social Cues Work for August Parrish   [] Ways to Stay Motivated   [] Preparing for Long Term Self-Management   [] Take Charge of Your Lifestyle   [] Mindful Eating, Mindful Movement   [] Manage Your Stress   [] Sit Less for Health   [] More Volume, Fewer Calories   [] Stay Active   [] Balance Your Thoughts   [] Heart Health    [] Looking Back and Looking Forward    Total time:  90 minutes, greater than 50% of visit spent in group counseling/education. 20 minutes were spent in reviewing chart, vital signs, labs, and answering the patient's individual questions. Assessment:       Diagnosis Orders   1. Essential hypertension        2. Gastroesophageal reflux disease, unspecified whether esophagitis present        3. Obesity (BMI 30-39.9)        4. Arthritis        5. Cervical radiculopathy          Pulse initially 114, recheck 102, slightly irregular. Asymptomatic. Will monitor. Plan:      Track food and weight. Return to clinic as per group medical appointment schedule. Follow-up:  Return in about 1 week (around 8/30/2022). Orders:  No orders of the defined types were placed in this encounter. Prescriptions:  No orders of the defined types were placed in this encounter.       Electronically signed by:  Katelyn Fabian CNP

## 2023-02-13 NOTE — ED NOTES
Patient stated she was with her  and daughter at a gas station. She stated she tripped on the sidewalk at gas station. No concerns for DV. Physician notified.      NAYELI Waldron  10/24/21 0028
Patient requests all Lab, Cardiology, and Radiology Results on their Discharge Instructions

## 2023-06-21 PROBLEM — M75.20 BICIPITAL TENDINITIS, UNSPECIFIED SHOULDER: Status: ACTIVE | Noted: 2022-09-15

## 2023-06-21 PROBLEM — M75.51 SUBACROMIAL BURSITIS OF RIGHT SHOULDER JOINT: Status: ACTIVE | Noted: 2022-09-15

## 2023-06-21 PROBLEM — L63.9 ALOPECIA AREATA: Status: ACTIVE | Noted: 2023-02-20

## 2023-09-18 PROBLEM — G89.29 CHRONIC PAIN: Status: ACTIVE | Noted: 2021-05-14

## 2023-09-18 PROBLEM — M71.9 DISORDER OF BURSAE OF SHOULDER REGION: Status: ACTIVE | Noted: 2022-08-28

## 2023-09-18 PROBLEM — G56.00 CARPAL TUNNEL SYNDROME: Status: ACTIVE | Noted: 2022-08-28

## 2023-09-18 PROBLEM — M50.30 DEGENERATION OF CERVICAL INTERVERTEBRAL DISC: Status: ACTIVE | Noted: 2020-05-11

## 2023-09-21 ENCOUNTER — HOSPITAL ENCOUNTER (OUTPATIENT)
Age: 48
Setting detail: SPECIMEN
Discharge: HOME | End: 2023-09-21

## 2023-09-21 DIAGNOSIS — I10 ESSENTIAL HYPERTENSION: ICD-10-CM

## 2023-09-21 DIAGNOSIS — Z11.4 SCREENING FOR HIV (HUMAN IMMUNODEFICIENCY VIRUS): ICD-10-CM

## 2023-09-21 DIAGNOSIS — Z11.59 ENCOUNTER FOR HEPATITIS C SCREENING TEST FOR LOW RISK PATIENT: ICD-10-CM

## 2023-09-21 LAB
ANION GAP SERPL CALCULATED.3IONS-SCNC: 13 MMOL/L (ref 9–16)
BASOPHILS # BLD: 0.1 K/UL (ref 0–0.2)
BASOPHILS NFR BLD: 1 % (ref 0–2)
BUN SERPL-MCNC: 10 MG/DL (ref 6–20)
CALCIUM SERPL-MCNC: 9.4 MG/DL (ref 8.6–10.4)
CHLORIDE SERPL-SCNC: 102 MMOL/L (ref 98–107)
CO2 SERPL-SCNC: 25 MMOL/L (ref 20–31)
CREAT SERPL-MCNC: 0.8 MG/DL (ref 0.5–0.9)
EOSINOPHIL # BLD: 0.25 K/UL (ref 0–0.44)
EOSINOPHILS RELATIVE PERCENT: 4 % (ref 1–4)
ERYTHROCYTE [DISTWIDTH] IN BLOOD BY AUTOMATED COUNT: 12.7 % (ref 11.8–14.4)
GFR SERPL CREATININE-BSD FRML MDRD: >60 ML/MIN/1.73M2
GLUCOSE SERPL-MCNC: 99 MG/DL (ref 74–99)
HCT VFR BLD AUTO: 43.1 % (ref 36.3–47.1)
HCV AB SERPL QL IA: NONREACTIVE
HGB BLD-MCNC: 14.1 G/DL (ref 11.9–15.1)
HIV 1+2 AB+HIV1 P24 AG SERPL QL IA: NONREACTIVE
IMM GRANULOCYTES # BLD AUTO: 0.03 K/UL (ref 0–0.3)
IMM GRANULOCYTES NFR BLD: 0 %
LYMPHOCYTES NFR BLD: 1.84 K/UL (ref 1.1–3.7)
LYMPHOCYTES RELATIVE PERCENT: 26 % (ref 24–43)
MCH RBC QN AUTO: 32.4 PG (ref 25.2–33.5)
MCHC RBC AUTO-ENTMCNC: 32.7 G/DL (ref 28.4–34.8)
MCV RBC AUTO: 99.1 FL (ref 82.6–102.9)
MONOCYTES NFR BLD: 0.64 K/UL (ref 0.1–1.2)
MONOCYTES NFR BLD: 9 % (ref 3–12)
NEUTROPHILS NFR BLD: 60 % (ref 36–65)
NEUTS SEG NFR BLD: 4.1 K/UL (ref 1.5–8.1)
NRBC BLD-RTO: 0 PER 100 WBC
PLATELET # BLD AUTO: 298 K/UL (ref 138–453)
PMV BLD AUTO: 11.7 FL (ref 8.1–13.5)
POTASSIUM SERPL-SCNC: 4 MMOL/L (ref 3.7–5.3)
RBC # BLD AUTO: 4.35 M/UL (ref 3.95–5.11)
SODIUM SERPL-SCNC: 140 MMOL/L (ref 136–145)
WBC OTHER # BLD: 7 K/UL (ref 3.5–11.3)

## 2023-10-03 PROBLEM — M50.30 OTHER CERVICAL DISC DEGENERATION, UNSPECIFIED CERVICAL REGION: Status: ACTIVE | Noted: 2020-05-11

## 2023-10-03 PROBLEM — M54.12 RADICULOPATHY, CERVICAL REGION: Status: ACTIVE | Noted: 2020-05-11

## 2024-05-06 PROBLEM — M75.21 BICEPS TENDINITIS OF RIGHT UPPER EXTREMITY: Status: ACTIVE | Noted: 2022-09-15

## 2024-05-06 PROBLEM — M50.30 DDD (DEGENERATIVE DISC DISEASE), CERVICAL: Status: ACTIVE | Noted: 2020-05-11

## 2025-01-21 ENCOUNTER — OFFICE VISIT (OUTPATIENT)
Dept: FAMILY MEDICINE CLINIC | Age: 50
End: 2025-01-21
Payer: COMMERCIAL

## 2025-01-21 VITALS
SYSTOLIC BLOOD PRESSURE: 122 MMHG | BODY MASS INDEX: 39.05 KG/M2 | TEMPERATURE: 98 F | OXYGEN SATURATION: 99 % | HEIGHT: 62 IN | WEIGHT: 212.2 LBS | HEART RATE: 107 BPM | DIASTOLIC BLOOD PRESSURE: 80 MMHG

## 2025-01-21 DIAGNOSIS — I10 ESSENTIAL HYPERTENSION: ICD-10-CM

## 2025-01-21 DIAGNOSIS — L30.9 DERMATITIS: Primary | ICD-10-CM

## 2025-01-21 DIAGNOSIS — K21.9 GASTROESOPHAGEAL REFLUX DISEASE WITHOUT ESOPHAGITIS: ICD-10-CM

## 2025-01-21 DIAGNOSIS — Z13.29 THYROID DISORDER SCREEN: ICD-10-CM

## 2025-01-21 DIAGNOSIS — F51.01 PRIMARY INSOMNIA: ICD-10-CM

## 2025-01-21 DIAGNOSIS — Z13.1 DIABETES MELLITUS SCREENING: ICD-10-CM

## 2025-01-21 DIAGNOSIS — Z13.220 LIPID SCREENING: ICD-10-CM

## 2025-01-21 DIAGNOSIS — Z12.11 COLON CANCER SCREENING: ICD-10-CM

## 2025-01-21 DIAGNOSIS — Z12.31 BREAST CANCER SCREENING BY MAMMOGRAM: ICD-10-CM

## 2025-01-21 PROCEDURE — 3079F DIAST BP 80-89 MM HG: CPT | Performed by: NURSE PRACTITIONER

## 2025-01-21 PROCEDURE — 99204 OFFICE O/P NEW MOD 45 MIN: CPT | Performed by: NURSE PRACTITIONER

## 2025-01-21 PROCEDURE — 3074F SYST BP LT 130 MM HG: CPT | Performed by: NURSE PRACTITIONER

## 2025-01-21 RX ORDER — ALPRAZOLAM 0.25 MG/1
0.25 TABLET ORAL NIGHTLY PRN
Qty: 15 TABLET | Refills: 0 | Status: SHIPPED | OUTPATIENT
Start: 2025-01-21 | End: 2025-02-20

## 2025-01-21 RX ORDER — FLUCONAZOLE 150 MG/1
150 TABLET ORAL WEEKLY
Qty: 2 TABLET | Refills: 0 | Status: SHIPPED | OUTPATIENT
Start: 2025-01-21 | End: 2025-02-04

## 2025-01-21 RX ORDER — CLOTRIMAZOLE AND BETAMETHASONE DIPROPIONATE 10; .64 MG/G; MG/G
CREAM TOPICAL
Qty: 45 G | Refills: 0 | Status: SHIPPED | OUTPATIENT
Start: 2025-01-21

## 2025-01-21 RX ORDER — LANSOPRAZOLE 30 MG/1
30 CAPSULE, DELAYED RELEASE ORAL DAILY
Qty: 90 CAPSULE | Refills: 1 | Status: SHIPPED | OUTPATIENT
Start: 2025-01-21

## 2025-01-21 SDOH — ECONOMIC STABILITY: FOOD INSECURITY: WITHIN THE PAST 12 MONTHS, THE FOOD YOU BOUGHT JUST DIDN'T LAST AND YOU DIDN'T HAVE MONEY TO GET MORE.: NEVER TRUE

## 2025-01-21 SDOH — ECONOMIC STABILITY: FOOD INSECURITY: WITHIN THE PAST 12 MONTHS, YOU WORRIED THAT YOUR FOOD WOULD RUN OUT BEFORE YOU GOT MONEY TO BUY MORE.: NEVER TRUE

## 2025-01-21 ASSESSMENT — PATIENT HEALTH QUESTIONNAIRE - PHQ9
SUM OF ALL RESPONSES TO PHQ9 QUESTIONS 1 & 2: 0
2. FEELING DOWN, DEPRESSED OR HOPELESS: NOT AT ALL
1. LITTLE INTEREST OR PLEASURE IN DOING THINGS: NOT AT ALL
SUM OF ALL RESPONSES TO PHQ QUESTIONS 1-9: 0

## 2025-01-21 ASSESSMENT — ENCOUNTER SYMPTOMS
VOMITING: 0
SORE THROAT: 0
SHORTNESS OF BREATH: 0
ABDOMINAL PAIN: 0
NAUSEA: 0
DIARRHEA: 0
SINUS PAIN: 0
COUGH: 0

## 2025-01-21 NOTE — PROGRESS NOTES
Visit Information    Have you changed or started any medications since your last visit including any over-the-counter medicines, vitamins, or herbal medicines? no   Have you stopped taking any of your medications? Is so, why? -  no  Are you having any side effects from any of your medications? - no    Have you seen any other physician or provider since your last visit?  no   Have you had any other diagnostic tests since your last visit?  no   Have you been seen in the emergency room and/or had an admission in a hospital since we last saw you?  no   Have you had your routine dental cleaning in the past 6 months?  no     Do you have an active MyChart account? If no, what is the barrier?  Yes    Patient Care Team:  Sid Madden APRN - CNP as PCP - General (Certified Nurse Practitioner)  Perla Torres APRN - CNP as PCP - Empaneled Provider  Jhonathan Dunn MD as Consulting Physician (Orthopedic Surgery)  Noah Mcdonnell MD as Consulting Physician (Physical Medicine and Rehab)  Ollie Miranda MD as Consulting Physician (Gynecology)  Michael Ríos MD as Consulting Physician (Gastroenterology)    Medical History Review  Past Medical, Family, and Social History reviewed and  contribute to the patient presenting condition    Health Maintenance   Topic Date Due    Hepatitis B vaccine (1 of 3 - 19+ 3-dose series) Never done    Breast cancer screen  06/11/2020    Shingles vaccine (1 of 2) Never done    Flu vaccine (1) 08/01/2024    COVID-19 Vaccine (3 - 2023-24 season) 09/01/2024    Colorectal Cancer Screen  10/23/2024    Depression Screen  05/06/2025    Lipids  05/31/2027    DTaP/Tdap/Td vaccine (3 - Td or Tdap) 10/08/2034    Hepatitis C screen  Completed    HIV screen  Completed    Hepatitis A vaccine  Aged Out    Hib vaccine  Aged Out    Polio vaccine  Aged Out    Meningococcal (ACWY) vaccine  Aged Out    Pneumococcal 0-64 years Vaccine  Aged Out    Diabetes screen  Discontinued    Cervical

## 2025-01-21 NOTE — PROGRESS NOTES
[facility]  [facility]  [address] [address]  Dept: [phone]  Dept Fax: [phone]    Sally Stein is a 49 y.o. female who presents today for her medicalconditions/complaints as noted below.  Sally Stein is c/o of Rash (Itchy rash on breast, arms, chest, and butt. Had it since sept went to [facility] urgent care was given antibiotic which seemed to help but did not take it away. /) and New Patient      HPI:         49-year-old female patient presents with concern for new patient appointment    Concerns with rash.  Patient reports she has been experiencing a rash for the past 5 months.  Patient reports intermittent reddened circles scattered to the chest abdomen and back.  Reports this started on September after she went on a cruise.  Reports that she was seen in urgent care in December treated with oral and topical antifungals has improved without resolution.    Hypertension managed with hydrochlorothiazide 25, metoprolol 25    Chronic neck pain, history of injury, status post laminectomy, follows with [facility] pain/physical medicine and rehab management Lyrica 200 twice daily    BMI 38, weight 212, currently on compounded semaglutide from telemedicine provider    GERD managed with lansoprazole    Anxiety, sleep disturbance, caring for mother who is chronically hospitalized, moderate distress has previously tried trazodone without relief, has previously taken Xanax as needed        Past Medical History:   Diagnosis Date    Anxiety     Arthritis of spine     COVID-19 11/30/2020    Tested at [facility] - anosmia/ageusia     GERD (gastroesophageal reflux disease)     History of colon polyps 10/23/2019    tubular adenoma    Hypertension         Current Outpatient Medications   Medication Sig Dispense Refill    fluconazole (DIFLUCAN) 150 MG tablet Take 1 tablet by mouth once a week for 14 days 2 tablet 0    clotrimazole-betamethasone (LOTRISONE)

## 2025-05-01 ENCOUNTER — TELEPHONE (OUTPATIENT)
Dept: GASTROENTEROLOGY | Age: 50
End: 2025-05-01

## 2025-08-27 ENCOUNTER — HOSPITAL ENCOUNTER (OUTPATIENT)
Age: 50
Setting detail: SPECIMEN
Discharge: HOME | End: 2025-08-27

## 2025-08-27 ENCOUNTER — APPOINTMENT (OUTPATIENT)
Dept: CT IMAGING | Age: 50
DRG: 434 | End: 2025-08-27
Payer: COMMERCIAL

## 2025-08-27 ENCOUNTER — HOSPITAL ENCOUNTER (INPATIENT)
Age: 50
LOS: 3 days | Discharge: HOME | DRG: 434 | End: 2025-08-30
Attending: EMERGENCY MEDICINE | Admitting: INTERNAL MEDICINE
Payer: COMMERCIAL

## 2025-08-27 ENCOUNTER — APPOINTMENT (OUTPATIENT)
Dept: ULTRASOUND IMAGING | Age: 50
DRG: 434 | End: 2025-08-27
Payer: COMMERCIAL

## 2025-08-27 DIAGNOSIS — Z13.220 LIPID SCREENING: ICD-10-CM

## 2025-08-27 DIAGNOSIS — Z13.1 DIABETES MELLITUS SCREENING: ICD-10-CM

## 2025-08-27 DIAGNOSIS — L30.9 DERMATITIS: ICD-10-CM

## 2025-08-27 DIAGNOSIS — Z13.29 THYROID DISORDER SCREEN: ICD-10-CM

## 2025-08-27 DIAGNOSIS — F51.01 PRIMARY INSOMNIA: ICD-10-CM

## 2025-08-27 DIAGNOSIS — Z12.31 BREAST CANCER SCREENING BY MAMMOGRAM: ICD-10-CM

## 2025-08-27 DIAGNOSIS — I10 ESSENTIAL HYPERTENSION: ICD-10-CM

## 2025-08-27 DIAGNOSIS — R17 JAUNDICE: Primary | ICD-10-CM

## 2025-08-27 LAB
ALBUMIN SERPL-MCNC: 3.3 G/DL (ref 3.5–5.2)
ALBUMIN SERPL-MCNC: 3.5 G/DL (ref 3.5–5.2)
ALBUMIN/GLOB SERPL: 0.8 {RATIO} (ref 1–2.5)
ALBUMIN/GLOB SERPL: 0.9 {RATIO} (ref 1–2.5)
ALP SERPL-CCNC: 251 U/L (ref 35–104)
ALP SERPL-CCNC: 252 U/L (ref 35–104)
ALT SERPL-CCNC: 55 U/L (ref 10–35)
ALT SERPL-CCNC: 58 U/L (ref 10–35)
ANION GAP SERPL CALCULATED.3IONS-SCNC: 14 MMOL/L (ref 9–16)
ANION GAP SERPL CALCULATED.3IONS-SCNC: 16 MMOL/L (ref 9–16)
AST SERPL-CCNC: 147 U/L (ref 10–35)
AST SERPL-CCNC: 161 U/L (ref 10–35)
BACTERIA URNS QL MICRO: ABNORMAL
BASOPHILS # BLD: 0.13 K/UL (ref 0–0.2)
BASOPHILS NFR BLD: 1 % (ref 0–2)
BILIRUB DIRECT SERPL-MCNC: 10.1 MG/DL (ref 0–0.2)
BILIRUB INDIRECT SERPL-MCNC: 3.6 MG/DL (ref 0–1)
BILIRUB SERPL-MCNC: 12.9 MG/DL (ref 0–1.2)
BILIRUB SERPL-MCNC: 13.7 MG/DL (ref 0–1.2)
BILIRUB UR QL STRIP: ABNORMAL
BUN SERPL-MCNC: 7 MG/DL (ref 6–20)
BUN SERPL-MCNC: 9 MG/DL (ref 6–20)
CALCIUM SERPL-MCNC: 9 MG/DL (ref 8.6–10.4)
CALCIUM SERPL-MCNC: 9.5 MG/DL (ref 8.6–10.4)
CHLORIDE SERPL-SCNC: 93 MMOL/L (ref 98–107)
CHLORIDE SERPL-SCNC: 96 MMOL/L (ref 98–107)
CHOLEST SERPL-MCNC: 253 MG/DL (ref 0–199)
CHOLESTEROL/HDL RATIO: 23
CLARITY UR: CLEAR
CO2 SERPL-SCNC: 24 MMOL/L (ref 20–31)
CO2 SERPL-SCNC: 25 MMOL/L (ref 20–31)
COLOR UR: ABNORMAL
CREAT SERPL-MCNC: 0.9 MG/DL (ref 0.6–0.9)
CREAT SERPL-MCNC: 0.9 MG/DL (ref 0.6–0.9)
EOSINOPHIL # BLD: 0.11 K/UL (ref 0–0.44)
EOSINOPHILS RELATIVE PERCENT: 1 % (ref 1–4)
EPI CELLS #/AREA URNS HPF: ABNORMAL /HPF (ref 0–5)
ERYTHROCYTE [DISTWIDTH] IN BLOOD BY AUTOMATED COUNT: 14.9 % (ref 11.8–14.4)
ERYTHROCYTE [DISTWIDTH] IN BLOOD BY AUTOMATED COUNT: 15.4 % (ref 11.8–14.4)
EST. AVERAGE GLUCOSE BLD GHB EST-MCNC: 80 MG/DL
GFR, ESTIMATED: 77 ML/MIN/1.73M2
GFR, ESTIMATED: 77 ML/MIN/1.73M2
GLOBULIN SER CALC-MCNC: 4.2 G/DL
GLUCOSE SERPL-MCNC: 95 MG/DL (ref 74–99)
GLUCOSE SERPL-MCNC: 98 MG/DL (ref 74–99)
GLUCOSE UR STRIP-MCNC: NEGATIVE MG/DL
HBA1C MFR BLD: 4.4 % (ref 4–6)
HCT VFR BLD AUTO: 37.5 % (ref 36.3–47.1)
HCT VFR BLD AUTO: 40.1 % (ref 36.3–47.1)
HDLC SERPL-MCNC: 11 MG/DL
HGB BLD-MCNC: 13 G/DL (ref 11.9–15.1)
HGB BLD-MCNC: 13.4 G/DL (ref 11.9–15.1)
HGB UR QL STRIP.AUTO: NEGATIVE
IMM GRANULOCYTES # BLD AUTO: 0.31 K/UL (ref 0–0.3)
IMM GRANULOCYTES NFR BLD: 2 %
INR PPP: 1
KETONES UR STRIP-MCNC: NEGATIVE MG/DL
LDLC SERPL CALC-MCNC: 176 MG/DL (ref 0–100)
LEUKOCYTE ESTERASE UR QL STRIP: NEGATIVE
LIPASE SERPL-CCNC: 56 U/L (ref 13–60)
LYMPHOCYTES NFR BLD: 2.08 K/UL (ref 1.1–3.7)
LYMPHOCYTES RELATIVE PERCENT: 16 % (ref 24–43)
MCH RBC QN AUTO: 34.3 PG (ref 25.2–33.5)
MCH RBC QN AUTO: 35.1 PG (ref 25.2–33.5)
MCHC RBC AUTO-ENTMCNC: 33.4 G/DL (ref 28.4–34.8)
MCHC RBC AUTO-ENTMCNC: 34.7 G/DL (ref 28.4–34.8)
MCV RBC AUTO: 105 FL (ref 82.6–102.9)
MCV RBC AUTO: 98.9 FL (ref 82.6–102.9)
MONOCYTES NFR BLD: 1.27 K/UL (ref 0.1–1.2)
MONOCYTES NFR BLD: 10 % (ref 3–12)
NEUTROPHILS NFR BLD: 70 % (ref 36–65)
NEUTS SEG NFR BLD: 8.93 K/UL (ref 1.5–8.1)
NITRITE UR QL STRIP: NEGATIVE
NRBC BLD-RTO: 0 PER 100 WBC
NRBC BLD-RTO: 0 PER 100 WBC
PARTIAL THROMBOPLASTIN TIME: 31.6 SEC (ref 23–36.5)
PH UR STRIP: 6.5 [PH] (ref 5–8)
PLATELET # BLD AUTO: 253 K/UL (ref 138–453)
PLATELET # BLD AUTO: 268 K/UL (ref 138–453)
PMV BLD AUTO: 11.4 FL (ref 8.1–13.5)
PMV BLD AUTO: 12.2 FL (ref 8.1–13.5)
POTASSIUM SERPL-SCNC: 3.8 MMOL/L (ref 3.7–5.3)
POTASSIUM SERPL-SCNC: 4.6 MMOL/L (ref 3.7–5.3)
PROT SERPL-MCNC: 7.5 G/DL (ref 6.6–8.7)
PROT SERPL-MCNC: 7.6 G/DL (ref 6.6–8.7)
PROT UR STRIP-MCNC: NEGATIVE MG/DL
PROTHROMBIN TIME: 13.8 SEC (ref 11.7–14.9)
RBC # BLD AUTO: 3.79 M/UL (ref 3.95–5.11)
RBC # BLD AUTO: 3.82 M/UL (ref 3.95–5.11)
RBC # BLD: ABNORMAL 10*6/UL
RBC #/AREA URNS HPF: ABNORMAL /HPF (ref 0–2)
SODIUM SERPL-SCNC: 131 MMOL/L (ref 136–145)
SODIUM SERPL-SCNC: 137 MMOL/L (ref 136–145)
SP GR UR STRIP: 1 (ref 1–1.03)
TRIGL SERPL-MCNC: 331 MG/DL
TSH SERPL DL<=0.05 MIU/L-ACNC: 1.52 UIU/ML (ref 0.27–4.2)
UROBILINOGEN UR STRIP-ACNC: NORMAL EU/DL (ref 0–1)
VLDLC SERPL CALC-MCNC: 66 MG/DL (ref 1–30)
WBC #/AREA URNS HPF: ABNORMAL /HPF (ref 0–5)
WBC OTHER # BLD: 10.5 K/UL (ref 3.5–11.3)
WBC OTHER # BLD: 12.8 K/UL (ref 3.5–11.3)

## 2025-08-27 PROCEDURE — 85025 COMPLETE CBC W/AUTO DIFF WBC: CPT

## 2025-08-27 PROCEDURE — 99285 EMERGENCY DEPT VISIT HI MDM: CPT

## 2025-08-27 PROCEDURE — 85730 THROMBOPLASTIN TIME PARTIAL: CPT

## 2025-08-27 PROCEDURE — 80076 HEPATIC FUNCTION PANEL: CPT

## 2025-08-27 PROCEDURE — 76705 ECHO EXAM OF ABDOMEN: CPT

## 2025-08-27 PROCEDURE — G0480 DRUG TEST DEF 1-7 CLASSES: HCPCS

## 2025-08-27 PROCEDURE — 81001 URINALYSIS AUTO W/SCOPE: CPT

## 2025-08-27 PROCEDURE — 85610 PROTHROMBIN TIME: CPT

## 2025-08-27 PROCEDURE — 99223 1ST HOSP IP/OBS HIGH 75: CPT | Performed by: STUDENT IN AN ORGANIZED HEALTH CARE EDUCATION/TRAINING PROGRAM

## 2025-08-27 PROCEDURE — 6360000004 HC RX CONTRAST MEDICATION

## 2025-08-27 PROCEDURE — 80048 BASIC METABOLIC PNL TOTAL CA: CPT

## 2025-08-27 PROCEDURE — 2500000003 HC RX 250 WO HCPCS: Performed by: STUDENT IN AN ORGANIZED HEALTH CARE EDUCATION/TRAINING PROGRAM

## 2025-08-27 PROCEDURE — 74177 CT ABD & PELVIS W/CONTRAST: CPT

## 2025-08-27 PROCEDURE — 2580000003 HC RX 258: Performed by: NURSE PRACTITIONER

## 2025-08-27 PROCEDURE — 6370000000 HC RX 637 (ALT 250 FOR IP): Performed by: NURSE PRACTITIONER

## 2025-08-27 PROCEDURE — 83690 ASSAY OF LIPASE: CPT

## 2025-08-27 PROCEDURE — 1200000000 HC SEMI PRIVATE

## 2025-08-27 PROCEDURE — 6360000002 HC RX W HCPCS: Performed by: EMERGENCY MEDICINE

## 2025-08-27 PROCEDURE — 2500000003 HC RX 250 WO HCPCS: Performed by: NURSE PRACTITIONER

## 2025-08-27 RX ORDER — SODIUM CHLORIDE 0.9 % (FLUSH) 0.9 %
5-40 SYRINGE (ML) INJECTION PRN
Status: DISCONTINUED | OUTPATIENT
Start: 2025-08-27 | End: 2025-08-30 | Stop reason: HOSPADM

## 2025-08-27 RX ORDER — ONDANSETRON 4 MG/1
4 TABLET, ORALLY DISINTEGRATING ORAL EVERY 8 HOURS PRN
Status: DISCONTINUED | OUTPATIENT
Start: 2025-08-27 | End: 2025-08-30 | Stop reason: HOSPADM

## 2025-08-27 RX ORDER — MAGNESIUM SULFATE IN WATER 40 MG/ML
2000 INJECTION, SOLUTION INTRAVENOUS PRN
Status: DISCONTINUED | OUTPATIENT
Start: 2025-08-27 | End: 2025-08-30 | Stop reason: HOSPADM

## 2025-08-27 RX ORDER — SODIUM CHLORIDE, SODIUM LACTATE, POTASSIUM CHLORIDE, CALCIUM CHLORIDE 600; 310; 30; 20 MG/100ML; MG/100ML; MG/100ML; MG/100ML
INJECTION, SOLUTION INTRAVENOUS CONTINUOUS
Status: ACTIVE | OUTPATIENT
Start: 2025-08-27 | End: 2025-08-28

## 2025-08-27 RX ORDER — SODIUM CHLORIDE 0.9 % (FLUSH) 0.9 %
10 SYRINGE (ML) INJECTION PRN
Status: DISCONTINUED | OUTPATIENT
Start: 2025-08-27 | End: 2025-08-30 | Stop reason: HOSPADM

## 2025-08-27 RX ORDER — MULTIVITAMIN WITH IRON
1 TABLET ORAL DAILY
Status: DISCONTINUED | OUTPATIENT
Start: 2025-08-28 | End: 2025-08-30 | Stop reason: HOSPADM

## 2025-08-27 RX ORDER — POLYETHYLENE GLYCOL 3350 17 G/17G
17 POWDER, FOR SOLUTION ORAL DAILY PRN
Status: DISCONTINUED | OUTPATIENT
Start: 2025-08-27 | End: 2025-08-30 | Stop reason: HOSPADM

## 2025-08-27 RX ORDER — ENOXAPARIN SODIUM 100 MG/ML
40 INJECTION SUBCUTANEOUS DAILY
Status: DISCONTINUED | OUTPATIENT
Start: 2025-08-28 | End: 2025-08-30 | Stop reason: HOSPADM

## 2025-08-27 RX ORDER — SODIUM CHLORIDE 0.9 % (FLUSH) 0.9 %
5-40 SYRINGE (ML) INJECTION EVERY 12 HOURS SCHEDULED
Status: DISCONTINUED | OUTPATIENT
Start: 2025-08-27 | End: 2025-08-30 | Stop reason: HOSPADM

## 2025-08-27 RX ORDER — ONDANSETRON 2 MG/ML
4 INJECTION INTRAMUSCULAR; INTRAVENOUS EVERY 6 HOURS PRN
Status: DISCONTINUED | OUTPATIENT
Start: 2025-08-27 | End: 2025-08-30 | Stop reason: HOSPADM

## 2025-08-27 RX ORDER — LANOLIN ALCOHOL/MO/W.PET/CERES
100 CREAM (GRAM) TOPICAL DAILY
Status: DISCONTINUED | OUTPATIENT
Start: 2025-08-28 | End: 2025-08-30 | Stop reason: HOSPADM

## 2025-08-27 RX ORDER — FENTANYL CITRATE 50 UG/ML
25 INJECTION, SOLUTION INTRAMUSCULAR; INTRAVENOUS ONCE
Refills: 0 | Status: COMPLETED | OUTPATIENT
Start: 2025-08-27 | End: 2025-08-27

## 2025-08-27 RX ORDER — FOLIC ACID 1 MG/1
1 TABLET ORAL DAILY
Status: DISCONTINUED | OUTPATIENT
Start: 2025-08-28 | End: 2025-08-30 | Stop reason: HOSPADM

## 2025-08-27 RX ORDER — PREGABALIN 100 MG/1
300 CAPSULE ORAL NIGHTLY
Status: DISCONTINUED | OUTPATIENT
Start: 2025-08-27 | End: 2025-08-28

## 2025-08-27 RX ORDER — POTASSIUM CHLORIDE 7.45 MG/ML
10 INJECTION INTRAVENOUS PRN
Status: DISCONTINUED | OUTPATIENT
Start: 2025-08-27 | End: 2025-08-30 | Stop reason: HOSPADM

## 2025-08-27 RX ORDER — SODIUM CHLORIDE 9 MG/ML
INJECTION, SOLUTION INTRAVENOUS PRN
Status: DISCONTINUED | OUTPATIENT
Start: 2025-08-27 | End: 2025-08-30 | Stop reason: HOSPADM

## 2025-08-27 RX ORDER — LANOLIN ALCOHOL/MO/W.PET/CERES
100 CREAM (GRAM) TOPICAL DAILY
Status: DISCONTINUED | OUTPATIENT
Start: 2025-08-28 | End: 2025-08-27

## 2025-08-27 RX ORDER — POTASSIUM CHLORIDE 1500 MG/1
40 TABLET, EXTENDED RELEASE ORAL PRN
Status: DISCONTINUED | OUTPATIENT
Start: 2025-08-27 | End: 2025-08-30 | Stop reason: HOSPADM

## 2025-08-27 RX ORDER — METOPROLOL SUCCINATE 25 MG/1
25 TABLET, EXTENDED RELEASE ORAL DAILY
Status: DISCONTINUED | OUTPATIENT
Start: 2025-08-27 | End: 2025-08-29

## 2025-08-27 RX ORDER — IOPAMIDOL 755 MG/ML
75 INJECTION, SOLUTION INTRAVASCULAR
Status: COMPLETED | OUTPATIENT
Start: 2025-08-27 | End: 2025-08-27

## 2025-08-27 RX ADMIN — IOPAMIDOL 75 ML: 755 INJECTION, SOLUTION INTRAVENOUS at 19:23

## 2025-08-27 RX ADMIN — PREGABALIN 300 MG: 100 CAPSULE ORAL at 23:42

## 2025-08-27 RX ADMIN — METOPROLOL SUCCINATE 25 MG: 25 TABLET, EXTENDED RELEASE ORAL at 23:42

## 2025-08-27 RX ADMIN — SODIUM CHLORIDE, SODIUM LACTATE, POTASSIUM CHLORIDE, AND CALCIUM CHLORIDE: .6; .31; .03; .02 INJECTION, SOLUTION INTRAVENOUS at 23:21

## 2025-08-27 RX ADMIN — SODIUM CHLORIDE, PRESERVATIVE FREE 10 ML: 5 INJECTION INTRAVENOUS at 23:19

## 2025-08-27 RX ADMIN — FENTANYL CITRATE 25 MCG: 50 INJECTION INTRAMUSCULAR; INTRAVENOUS at 22:11

## 2025-08-27 ASSESSMENT — ENCOUNTER SYMPTOMS
NAUSEA: 1
ABDOMINAL PAIN: 1
CONSTIPATION: 1
DIARRHEA: 0
SHORTNESS OF BREATH: 0
ABDOMINAL DISTENTION: 1
VOMITING: 0
COLOR CHANGE: 1

## 2025-08-27 ASSESSMENT — PAIN DESCRIPTION - LOCATION: LOCATION: ABDOMEN

## 2025-08-27 ASSESSMENT — PAIN SCALES - GENERAL
PAINLEVEL_OUTOF10: 6
PAINLEVEL_OUTOF10: 0

## 2025-08-27 ASSESSMENT — PAIN DESCRIPTION - ORIENTATION: ORIENTATION: UPPER

## 2025-08-27 ASSESSMENT — LIFESTYLE VARIABLES
HOW MANY STANDARD DRINKS CONTAINING ALCOHOL DO YOU HAVE ON A TYPICAL DAY: 7 TO 9
HOW OFTEN DO YOU HAVE A DRINK CONTAINING ALCOHOL: 4 OR MORE TIMES A WEEK

## 2025-08-27 ASSESSMENT — PAIN - FUNCTIONAL ASSESSMENT: PAIN_FUNCTIONAL_ASSESSMENT: 0-10

## 2025-08-28 ENCOUNTER — APPOINTMENT (OUTPATIENT)
Dept: MRI IMAGING | Age: 50
DRG: 434 | End: 2025-08-28
Payer: COMMERCIAL

## 2025-08-28 ENCOUNTER — TELEPHONE (OUTPATIENT)
Dept: GASTROENTEROLOGY | Age: 50
End: 2025-08-28

## 2025-08-28 PROBLEM — R17 JAUNDICE: Status: ACTIVE | Noted: 2025-08-28

## 2025-08-28 PROBLEM — K83.8 COMMON BILE DUCT DILATION: Status: ACTIVE | Noted: 2025-08-28

## 2025-08-28 PROBLEM — K80.20 CHOLELITHIASIS: Status: ACTIVE | Noted: 2025-08-28

## 2025-08-28 LAB
ALBUMIN SERPL-MCNC: 3 G/DL (ref 3.5–5.2)
ALBUMIN/GLOB SERPL: 0.8 {RATIO} (ref 1–2.5)
ALP SERPL-CCNC: 216 U/L (ref 35–104)
ALT SERPL-CCNC: 46 U/L (ref 10–35)
ANION GAP SERPL CALCULATED.3IONS-SCNC: 15 MMOL/L (ref 9–16)
AST SERPL-CCNC: 121 U/L (ref 10–35)
BILIRUB SERPL-MCNC: 11.6 MG/DL (ref 0–1.2)
BUN SERPL-MCNC: 8 MG/DL (ref 6–20)
CALCIUM SERPL-MCNC: 8.8 MG/DL (ref 8.6–10.4)
CHLORIDE SERPL-SCNC: 96 MMOL/L (ref 98–107)
CO2 SERPL-SCNC: 24 MMOL/L (ref 20–31)
CREAT SERPL-MCNC: 0.7 MG/DL (ref 0.6–0.9)
ETHANOL PERCENT: <0.01 %
ETHANOLAMINE SERPL-MCNC: <10 MG/DL (ref 0–0.08)
GFR, ESTIMATED: >90 ML/MIN/1.73M2
GLUCOSE SERPL-MCNC: 90 MG/DL (ref 74–99)
HAV IGM SERPL QL IA: NONREACTIVE
HBV CORE IGM SERPL QL IA: NONREACTIVE
HBV SURFACE AG SERPL QL IA: NONREACTIVE
HCV AB SERPL QL IA: NONREACTIVE
INR PPP: 1
POTASSIUM SERPL-SCNC: 3.7 MMOL/L (ref 3.7–5.3)
PROT SERPL-MCNC: 6.7 G/DL (ref 6.6–8.7)
PROTHROMBIN TIME: 13.8 SEC (ref 11.7–14.9)
SODIUM SERPL-SCNC: 135 MMOL/L (ref 136–145)

## 2025-08-28 PROCEDURE — 6360000002 HC RX W HCPCS: Performed by: STUDENT IN AN ORGANIZED HEALTH CARE EDUCATION/TRAINING PROGRAM

## 2025-08-28 PROCEDURE — 6360000004 HC RX CONTRAST MEDICATION

## 2025-08-28 PROCEDURE — 6370000000 HC RX 637 (ALT 250 FOR IP): Performed by: INTERNAL MEDICINE

## 2025-08-28 PROCEDURE — 6370000000 HC RX 637 (ALT 250 FOR IP): Performed by: PHYSICIAN ASSISTANT

## 2025-08-28 PROCEDURE — 80074 ACUTE HEPATITIS PANEL: CPT

## 2025-08-28 PROCEDURE — 6370000000 HC RX 637 (ALT 250 FOR IP): Performed by: NURSE PRACTITIONER

## 2025-08-28 PROCEDURE — 86225 DNA ANTIBODY NATIVE: CPT

## 2025-08-28 PROCEDURE — 36415 COLL VENOUS BLD VENIPUNCTURE: CPT

## 2025-08-28 PROCEDURE — A9576 INJ PROHANCE MULTIPACK: HCPCS

## 2025-08-28 PROCEDURE — 99232 SBSQ HOSP IP/OBS MODERATE 35: CPT | Performed by: INTERNAL MEDICINE

## 2025-08-28 PROCEDURE — 86038 ANTINUCLEAR ANTIBODIES: CPT

## 2025-08-28 PROCEDURE — 74183 MRI ABD W/O CNTR FLWD CNTR: CPT

## 2025-08-28 PROCEDURE — 85610 PROTHROMBIN TIME: CPT

## 2025-08-28 PROCEDURE — 83516 IMMUNOASSAY NONANTIBODY: CPT

## 2025-08-28 PROCEDURE — 80053 COMPREHEN METABOLIC PANEL: CPT

## 2025-08-28 PROCEDURE — 1200000000 HC SEMI PRIVATE

## 2025-08-28 PROCEDURE — 2580000003 HC RX 258: Performed by: NURSE PRACTITIONER

## 2025-08-28 PROCEDURE — 2500000003 HC RX 250 WO HCPCS: Performed by: STUDENT IN AN ORGANIZED HEALTH CARE EDUCATION/TRAINING PROGRAM

## 2025-08-28 PROCEDURE — 94761 N-INVAS EAR/PLS OXIMETRY MLT: CPT

## 2025-08-28 PROCEDURE — 82105 ALPHA-FETOPROTEIN SERUM: CPT

## 2025-08-28 PROCEDURE — 6360000002 HC RX W HCPCS: Performed by: NURSE PRACTITIONER

## 2025-08-28 PROCEDURE — 2500000003 HC RX 250 WO HCPCS

## 2025-08-28 PROCEDURE — 6370000000 HC RX 637 (ALT 250 FOR IP): Performed by: STUDENT IN AN ORGANIZED HEALTH CARE EDUCATION/TRAINING PROGRAM

## 2025-08-28 RX ORDER — PREGABALIN 100 MG/1
300 CAPSULE ORAL 2 TIMES DAILY
Status: DISCONTINUED | OUTPATIENT
Start: 2025-08-28 | End: 2025-08-30 | Stop reason: HOSPADM

## 2025-08-28 RX ORDER — DOCUSATE SODIUM 100 MG/1
100 CAPSULE, LIQUID FILLED ORAL 2 TIMES DAILY
Status: DISCONTINUED | OUTPATIENT
Start: 2025-08-28 | End: 2025-08-30 | Stop reason: HOSPADM

## 2025-08-28 RX ORDER — METRONIDAZOLE 500 MG/100ML
500 INJECTION, SOLUTION INTRAVENOUS EVERY 6 HOURS
Status: DISCONTINUED | OUTPATIENT
Start: 2025-08-28 | End: 2025-08-30 | Stop reason: HOSPADM

## 2025-08-28 RX ORDER — POLYETHYLENE GLYCOL 3350 17 G/17G
17 POWDER, FOR SOLUTION ORAL ONCE
Status: COMPLETED | OUTPATIENT
Start: 2025-08-28 | End: 2025-08-28

## 2025-08-28 RX ORDER — GADOTERIDOL 279.3 MG/ML
16 INJECTION INTRAVENOUS
Status: COMPLETED | OUTPATIENT
Start: 2025-08-28 | End: 2025-08-28

## 2025-08-28 RX ORDER — PREGABALIN 100 MG/1
300 CAPSULE ORAL ONCE
Status: COMPLETED | OUTPATIENT
Start: 2025-08-28 | End: 2025-08-28

## 2025-08-28 RX ORDER — DOCUSATE SODIUM 100 MG/1
100 CAPSULE, LIQUID FILLED ORAL DAILY
Status: DISCONTINUED | OUTPATIENT
Start: 2025-08-28 | End: 2025-08-28

## 2025-08-28 RX ORDER — SODIUM CHLORIDE 0.9 % (FLUSH) 0.9 %
10 SYRINGE (ML) INJECTION PRN
Status: DISCONTINUED | OUTPATIENT
Start: 2025-08-28 | End: 2025-08-30 | Stop reason: HOSPADM

## 2025-08-28 RX ORDER — SODIUM CHLORIDE 9 MG/ML
30 INJECTION, SOLUTION INTRAMUSCULAR; INTRAVENOUS; SUBCUTANEOUS PRN
Status: DISCONTINUED | OUTPATIENT
Start: 2025-08-28 | End: 2025-08-30 | Stop reason: HOSPADM

## 2025-08-28 RX ADMIN — Medication 100 MG: at 13:47

## 2025-08-28 RX ADMIN — ONDANSETRON 4 MG: 2 INJECTION, SOLUTION INTRAMUSCULAR; INTRAVENOUS at 07:15

## 2025-08-28 RX ADMIN — WATER 1000 MG: 1 INJECTION INTRAMUSCULAR; INTRAVENOUS; SUBCUTANEOUS at 07:47

## 2025-08-28 RX ADMIN — HYDROMORPHONE HYDROCHLORIDE 0.5 MG: 1 INJECTION, SOLUTION INTRAMUSCULAR; INTRAVENOUS; SUBCUTANEOUS at 07:58

## 2025-08-28 RX ADMIN — POLYETHYLENE GLYCOL 3350 17 G: 17 POWDER, FOR SOLUTION ORAL at 13:03

## 2025-08-28 RX ADMIN — POLYETHYLENE GLYCOL 3350 17 G: 17 POWDER, FOR SOLUTION ORAL at 19:33

## 2025-08-28 RX ADMIN — METOPROLOL SUCCINATE 25 MG: 25 TABLET, EXTENDED RELEASE ORAL at 19:34

## 2025-08-28 RX ADMIN — METRONIDAZOLE 500 MG: 500 INJECTION, SOLUTION INTRAVENOUS at 08:11

## 2025-08-28 RX ADMIN — SODIUM CHLORIDE, PRESERVATIVE FREE 10 ML: 5 INJECTION INTRAVENOUS at 11:05

## 2025-08-28 RX ADMIN — GADOTERIDOL 16 ML: 279.3 INJECTION, SOLUTION INTRAVENOUS at 11:05

## 2025-08-28 RX ADMIN — ENOXAPARIN SODIUM 40 MG: 100 INJECTION SUBCUTANEOUS at 09:16

## 2025-08-28 RX ADMIN — METRONIDAZOLE 500 MG: 500 INJECTION, SOLUTION INTRAVENOUS at 19:36

## 2025-08-28 RX ADMIN — DOCUSATE SODIUM 100 MG: 100 CAPSULE, LIQUID FILLED ORAL at 19:34

## 2025-08-28 RX ADMIN — PREGABALIN 300 MG: 100 CAPSULE ORAL at 11:41

## 2025-08-28 RX ADMIN — HYDROMORPHONE HYDROCHLORIDE 0.5 MG: 1 INJECTION, SOLUTION INTRAMUSCULAR; INTRAVENOUS; SUBCUTANEOUS at 19:33

## 2025-08-28 RX ADMIN — FOLIC ACID 1 MG: 1 TABLET ORAL at 13:47

## 2025-08-28 RX ADMIN — SODIUM CHLORIDE, SODIUM LACTATE, POTASSIUM CHLORIDE, AND CALCIUM CHLORIDE: .6; .31; .03; .02 INJECTION, SOLUTION INTRAVENOUS at 13:50

## 2025-08-28 RX ADMIN — MULTIVITAMIN TABLET 1 TABLET: TABLET at 13:47

## 2025-08-28 RX ADMIN — DOCUSATE SODIUM 100 MG: 100 CAPSULE, LIQUID FILLED ORAL at 13:03

## 2025-08-28 RX ADMIN — PREGABALIN 300 MG: 100 CAPSULE ORAL at 20:31

## 2025-08-28 RX ADMIN — SODIUM CHLORIDE 30 ML: 9 INJECTION INTRAMUSCULAR; INTRAVENOUS; SUBCUTANEOUS at 11:05

## 2025-08-28 RX ADMIN — METRONIDAZOLE 500 MG: 500 INJECTION, SOLUTION INTRAVENOUS at 14:23

## 2025-08-28 ASSESSMENT — PAIN - FUNCTIONAL ASSESSMENT
PAIN_FUNCTIONAL_ASSESSMENT: 0-10
PAIN_FUNCTIONAL_ASSESSMENT: PREVENTS OR INTERFERES SOME ACTIVE ACTIVITIES AND ADLS
PAIN_FUNCTIONAL_ASSESSMENT: 0-10

## 2025-08-28 ASSESSMENT — PAIN DESCRIPTION - DESCRIPTORS
DESCRIPTORS: ACHING
DESCRIPTORS: SHOOTING;SHARP

## 2025-08-28 ASSESSMENT — PAIN SCALES - GENERAL
PAINLEVEL_OUTOF10: 5
PAINLEVEL_OUTOF10: 5
PAINLEVEL_OUTOF10: 7

## 2025-08-28 ASSESSMENT — PAIN DESCRIPTION - LOCATION
LOCATION: ABDOMEN
LOCATION: ABDOMEN

## 2025-08-28 ASSESSMENT — PAIN DESCRIPTION - ORIENTATION: ORIENTATION: RIGHT;MID;LOWER;LEFT

## 2025-08-29 PROBLEM — F10.20 ALCOHOLISM (HCC): Status: ACTIVE | Noted: 2025-08-29

## 2025-08-29 PROBLEM — K70.10 ALCOHOLIC HEPATITIS (HCC): Status: ACTIVE | Noted: 2025-08-29

## 2025-08-29 PROBLEM — K76.0 HEPATIC STEATOSIS: Status: ACTIVE | Noted: 2025-08-29

## 2025-08-29 LAB
ALBUMIN SERPL-MCNC: 3.1 G/DL (ref 3.5–5.2)
ALBUMIN/GLOB SERPL: 0.8 {RATIO} (ref 1–2.5)
ALP SERPL-CCNC: 220 U/L (ref 35–104)
ALT SERPL-CCNC: 44 U/L (ref 10–35)
ANION GAP SERPL CALCULATED.3IONS-SCNC: 12 MMOL/L (ref 9–16)
AST SERPL-CCNC: 112 U/L (ref 10–35)
BASOPHILS # BLD: 0.14 K/UL (ref 0–0.2)
BASOPHILS NFR BLD: 1 % (ref 0–2)
BILIRUB SERPL-MCNC: 11.7 MG/DL (ref 0–1.2)
BUN SERPL-MCNC: 7 MG/DL (ref 6–20)
CALCIUM SERPL-MCNC: 8.9 MG/DL (ref 8.6–10.4)
CHLORIDE SERPL-SCNC: 97 MMOL/L (ref 98–107)
CO2 SERPL-SCNC: 25 MMOL/L (ref 20–31)
CREAT SERPL-MCNC: 0.7 MG/DL (ref 0.6–0.9)
EOSINOPHIL # BLD: 0.15 K/UL (ref 0–0.44)
EOSINOPHILS RELATIVE PERCENT: 2 % (ref 1–4)
ERYTHROCYTE [DISTWIDTH] IN BLOOD BY AUTOMATED COUNT: 15.8 % (ref 11.8–14.4)
GFR, ESTIMATED: >90 ML/MIN/1.73M2
GLUCOSE SERPL-MCNC: 108 MG/DL (ref 74–99)
HCT VFR BLD AUTO: 35.1 % (ref 36.3–47.1)
HGB BLD-MCNC: 11.7 G/DL (ref 11.9–15.1)
IMM GRANULOCYTES # BLD AUTO: 0.26 K/UL (ref 0–0.3)
IMM GRANULOCYTES NFR BLD: 3 %
LYMPHOCYTES NFR BLD: 1.58 K/UL (ref 1.1–3.7)
LYMPHOCYTES RELATIVE PERCENT: 15 % (ref 24–43)
MCH RBC QN AUTO: 34.5 PG (ref 25.2–33.5)
MCHC RBC AUTO-ENTMCNC: 33.3 G/DL (ref 28.4–34.8)
MCV RBC AUTO: 103.5 FL (ref 82.6–102.9)
MONOCYTES NFR BLD: 1.31 K/UL (ref 0.1–1.2)
MONOCYTES NFR BLD: 13 % (ref 3–12)
NEUTROPHILS NFR BLD: 66 % (ref 36–65)
NEUTS SEG NFR BLD: 6.83 K/UL (ref 1.5–8.1)
NRBC BLD-RTO: 0 PER 100 WBC
PLATELET # BLD AUTO: 233 K/UL (ref 138–453)
PMV BLD AUTO: 11 FL (ref 8.1–13.5)
POTASSIUM SERPL-SCNC: 4 MMOL/L (ref 3.7–5.3)
PROT SERPL-MCNC: 6.8 G/DL (ref 6.6–8.7)
RBC # BLD AUTO: 3.39 M/UL (ref 3.95–5.11)
RBC # BLD: ABNORMAL 10*6/UL
RBC # BLD: ABNORMAL 10*6/UL
SODIUM SERPL-SCNC: 134 MMOL/L (ref 136–145)
WBC OTHER # BLD: 10.3 K/UL (ref 3.5–11.3)

## 2025-08-29 PROCEDURE — 6370000000 HC RX 637 (ALT 250 FOR IP): Performed by: INTERNAL MEDICINE

## 2025-08-29 PROCEDURE — 1200000000 HC SEMI PRIVATE

## 2025-08-29 PROCEDURE — 99232 SBSQ HOSP IP/OBS MODERATE 35: CPT | Performed by: INTERNAL MEDICINE

## 2025-08-29 PROCEDURE — 99223 1ST HOSP IP/OBS HIGH 75: CPT | Performed by: INTERNAL MEDICINE

## 2025-08-29 PROCEDURE — 85025 COMPLETE CBC W/AUTO DIFF WBC: CPT

## 2025-08-29 PROCEDURE — 6360000002 HC RX W HCPCS: Performed by: NURSE PRACTITIONER

## 2025-08-29 PROCEDURE — 6370000000 HC RX 637 (ALT 250 FOR IP): Performed by: NURSE PRACTITIONER

## 2025-08-29 PROCEDURE — 94760 N-INVAS EAR/PLS OXIMETRY 1: CPT

## 2025-08-29 PROCEDURE — 80053 COMPREHEN METABOLIC PANEL: CPT

## 2025-08-29 PROCEDURE — 6370000000 HC RX 637 (ALT 250 FOR IP): Performed by: STUDENT IN AN ORGANIZED HEALTH CARE EDUCATION/TRAINING PROGRAM

## 2025-08-29 PROCEDURE — 2500000003 HC RX 250 WO HCPCS: Performed by: STUDENT IN AN ORGANIZED HEALTH CARE EDUCATION/TRAINING PROGRAM

## 2025-08-29 PROCEDURE — 6360000002 HC RX W HCPCS: Performed by: STUDENT IN AN ORGANIZED HEALTH CARE EDUCATION/TRAINING PROGRAM

## 2025-08-29 PROCEDURE — 36415 COLL VENOUS BLD VENIPUNCTURE: CPT

## 2025-08-29 PROCEDURE — 6370000000 HC RX 637 (ALT 250 FOR IP): Performed by: PHYSICIAN ASSISTANT

## 2025-08-29 RX ORDER — METOPROLOL SUCCINATE 25 MG/1
25 TABLET, EXTENDED RELEASE ORAL NIGHTLY
Status: DISCONTINUED | OUTPATIENT
Start: 2025-08-30 | End: 2025-08-29

## 2025-08-29 RX ORDER — PREDNISOLONE ORAL SOLUTION 15 MG/5ML
40 SOLUTION ORAL ONCE
Status: COMPLETED | OUTPATIENT
Start: 2025-08-29 | End: 2025-08-29

## 2025-08-29 RX ORDER — DICYCLOMINE HYDROCHLORIDE 10 MG/1
20 CAPSULE ORAL 4 TIMES DAILY PRN
Status: DISCONTINUED | OUTPATIENT
Start: 2025-08-29 | End: 2025-08-30 | Stop reason: HOSPADM

## 2025-08-29 RX ORDER — METOPROLOL SUCCINATE 25 MG/1
25 TABLET, EXTENDED RELEASE ORAL NIGHTLY
Status: DISCONTINUED | OUTPATIENT
Start: 2025-08-29 | End: 2025-08-30 | Stop reason: HOSPADM

## 2025-08-29 RX ORDER — PANTOPRAZOLE SODIUM 40 MG/1
40 TABLET, DELAYED RELEASE ORAL
Status: DISCONTINUED | OUTPATIENT
Start: 2025-08-29 | End: 2025-08-30 | Stop reason: HOSPADM

## 2025-08-29 RX ORDER — POLYETHYLENE GLYCOL 3350 17 G/17G
119 POWDER, FOR SOLUTION ORAL ONCE
Status: COMPLETED | OUTPATIENT
Start: 2025-08-29 | End: 2025-08-29

## 2025-08-29 RX ORDER — PREDNISOLONE ORAL SOLUTION 15 MG/5ML
30 SOLUTION ORAL ONCE
Status: COMPLETED | OUTPATIENT
Start: 2025-08-30 | End: 2025-08-30

## 2025-08-29 RX ORDER — PREDNISOLONE ORAL SOLUTION 15 MG/5ML
40 SOLUTION ORAL DAILY
Status: DISCONTINUED | OUTPATIENT
Start: 2025-08-29 | End: 2025-08-29

## 2025-08-29 RX ADMIN — DOCUSATE SODIUM 100 MG: 100 CAPSULE, LIQUID FILLED ORAL at 08:33

## 2025-08-29 RX ADMIN — METOPROLOL SUCCINATE 25 MG: 25 TABLET, EXTENDED RELEASE ORAL at 22:59

## 2025-08-29 RX ADMIN — HYDROMORPHONE HYDROCHLORIDE 0.5 MG: 1 INJECTION, SOLUTION INTRAMUSCULAR; INTRAVENOUS; SUBCUTANEOUS at 09:31

## 2025-08-29 RX ADMIN — WATER 1000 MG: 1 INJECTION INTRAMUSCULAR; INTRAVENOUS; SUBCUTANEOUS at 08:32

## 2025-08-29 RX ADMIN — MULTIVITAMIN TABLET 1 TABLET: TABLET at 08:33

## 2025-08-29 RX ADMIN — METRONIDAZOLE 500 MG: 500 INJECTION, SOLUTION INTRAVENOUS at 08:48

## 2025-08-29 RX ADMIN — PREGABALIN 300 MG: 100 CAPSULE ORAL at 21:28

## 2025-08-29 RX ADMIN — METRONIDAZOLE 500 MG: 500 INJECTION, SOLUTION INTRAVENOUS at 01:22

## 2025-08-29 RX ADMIN — PANTOPRAZOLE SODIUM 40 MG: 40 TABLET, DELAYED RELEASE ORAL at 08:32

## 2025-08-29 RX ADMIN — FOLIC ACID 1 MG: 1 TABLET ORAL at 08:34

## 2025-08-29 RX ADMIN — METRONIDAZOLE 500 MG: 500 INJECTION, SOLUTION INTRAVENOUS at 19:13

## 2025-08-29 RX ADMIN — Medication 100 MG: at 08:35

## 2025-08-29 RX ADMIN — POLYETHYLENE GLYCOL 3350 17 G: 17 POWDER, FOR SOLUTION ORAL at 08:33

## 2025-08-29 RX ADMIN — POLYETHYLENE GLYCOL 3350 119 G: 17 POWDER, FOR SOLUTION ORAL at 12:57

## 2025-08-29 RX ADMIN — PREDNISOLONE 40 MG: 15 SOLUTION ORAL at 12:58

## 2025-08-29 RX ADMIN — METRONIDAZOLE 500 MG: 500 INJECTION, SOLUTION INTRAVENOUS at 13:39

## 2025-08-29 RX ADMIN — ENOXAPARIN SODIUM 40 MG: 100 INJECTION SUBCUTANEOUS at 08:34

## 2025-08-29 RX ADMIN — PREGABALIN 300 MG: 100 CAPSULE ORAL at 08:33

## 2025-08-29 RX ADMIN — DICYCLOMINE HYDROCHLORIDE 20 MG: 10 CAPSULE ORAL at 10:49

## 2025-08-29 ASSESSMENT — PAIN SCALES - GENERAL
PAINLEVEL_OUTOF10: 3
PAINLEVEL_OUTOF10: 6
PAINLEVEL_OUTOF10: 4
PAINLEVEL_OUTOF10: 8
PAINLEVEL_OUTOF10: 4

## 2025-08-29 ASSESSMENT — PAIN - FUNCTIONAL ASSESSMENT
PAIN_FUNCTIONAL_ASSESSMENT: 0-10

## 2025-08-29 ASSESSMENT — ENCOUNTER SYMPTOMS
ABDOMINAL PAIN: 1
VOMITING: 0
RESPIRATORY NEGATIVE: 1
NAUSEA: 0
ABDOMINAL DISTENTION: 1
CONSTIPATION: 1

## 2025-08-29 ASSESSMENT — PAIN DESCRIPTION - LOCATION
LOCATION: ABDOMEN
LOCATION: ABDOMEN

## 2025-08-30 VITALS
OXYGEN SATURATION: 95 % | BODY MASS INDEX: 36.8 KG/M2 | WEIGHT: 199.96 LBS | DIASTOLIC BLOOD PRESSURE: 91 MMHG | TEMPERATURE: 98.8 F | HEIGHT: 62 IN | SYSTOLIC BLOOD PRESSURE: 131 MMHG | RESPIRATION RATE: 18 BRPM | HEART RATE: 84 BPM

## 2025-08-30 LAB
A1AT SERPL-MCNC: 190 MG/DL (ref 90–200)
ALBUMIN SERPL-MCNC: 3 G/DL (ref 3.5–5.2)
ALBUMIN/GLOB SERPL: 0.8 {RATIO} (ref 1–2.5)
ALP SERPL-CCNC: 197 U/L (ref 35–104)
ALT SERPL-CCNC: 40 U/L (ref 10–35)
ANION GAP SERPL CALCULATED.3IONS-SCNC: 12 MMOL/L (ref 9–16)
AST SERPL-CCNC: 85 U/L (ref 10–35)
BASOPHILS # BLD: 0 K/UL (ref 0–0.2)
BASOPHILS NFR BLD: 0 % (ref 0–2)
BILIRUB SERPL-MCNC: 9.9 MG/DL (ref 0–1.2)
BUN SERPL-MCNC: 6 MG/DL (ref 6–20)
CALCIUM SERPL-MCNC: 8.8 MG/DL (ref 8.6–10.4)
CERULOPLASMIN SERPL-MCNC: 26 MG/DL (ref 16–45)
CHLORIDE SERPL-SCNC: 99 MMOL/L (ref 98–107)
CHOLEST SERPL-MCNC: 241 MG/DL (ref 0–199)
CHOLESTEROL/HDL RATIO: 21.9
CO2 SERPL-SCNC: 24 MMOL/L (ref 20–31)
CREAT SERPL-MCNC: 0.7 MG/DL (ref 0.6–0.9)
EOSINOPHIL # BLD: 0 K/UL (ref 0–0.4)
EOSINOPHILS RELATIVE PERCENT: 0 % (ref 1–4)
ERYTHROCYTE [DISTWIDTH] IN BLOOD BY AUTOMATED COUNT: 16.1 % (ref 11.8–14.4)
FERRITIN SERPL-MCNC: 736 NG/ML (ref 15–150)
GFR, ESTIMATED: >90 ML/MIN/1.73M2
GLUCOSE SERPL-MCNC: 114 MG/DL (ref 74–99)
HCT VFR BLD AUTO: 33.4 % (ref 36.3–47.1)
HDLC SERPL-MCNC: 11 MG/DL
HGB BLD-MCNC: 11.3 G/DL (ref 11.9–15.1)
IMM GRANULOCYTES # BLD AUTO: 0 K/UL (ref 0–0.3)
IMM GRANULOCYTES NFR BLD: 0 %
IRON SATN MFR SERPL: 43 % (ref 20–55)
IRON SERPL-MCNC: 85 UG/DL (ref 37–145)
LDLC SERPL CALC-MCNC: 163 MG/DL (ref 0–100)
LYMPHOCYTES NFR BLD: 1.76 K/UL (ref 1–4.8)
LYMPHOCYTES RELATIVE PERCENT: 13 % (ref 24–44)
MCH RBC QN AUTO: 34.7 PG (ref 25.2–33.5)
MCHC RBC AUTO-ENTMCNC: 33.8 G/DL (ref 28.4–34.8)
MCV RBC AUTO: 102.5 FL (ref 82.6–102.9)
MONOCYTES NFR BLD: 1.08 K/UL (ref 0.1–0.8)
MONOCYTES NFR BLD: 8 % (ref 1–7)
MORPHOLOGY: ABNORMAL
MORPHOLOGY: ABNORMAL
NEUTROPHILS NFR BLD: 79 % (ref 36–66)
NEUTS SEG NFR BLD: 10.66 K/UL (ref 1.8–7.7)
NRBC BLD-RTO: 0 PER 100 WBC
PLATELET # BLD AUTO: 241 K/UL (ref 138–453)
PMV BLD AUTO: 11.3 FL (ref 8.1–13.5)
POTASSIUM SERPL-SCNC: 3.9 MMOL/L (ref 3.7–5.3)
PROT SERPL-MCNC: 6.7 G/DL (ref 6.6–8.7)
RBC # BLD AUTO: 3.26 M/UL (ref 3.95–5.11)
SODIUM SERPL-SCNC: 135 MMOL/L (ref 136–145)
TIBC SERPL-MCNC: 196 UG/DL (ref 250–450)
TRIGL SERPL-MCNC: 335 MG/DL
UNSATURATED IRON BINDING CAPACITY: 111 UG/DL (ref 112–347)
VLDLC SERPL CALC-MCNC: 67 MG/DL (ref 1–30)
WBC OTHER # BLD: 13.5 K/UL (ref 3.5–11.3)

## 2025-08-30 PROCEDURE — 80053 COMPREHEN METABOLIC PANEL: CPT

## 2025-08-30 PROCEDURE — 82390 ASSAY OF CERULOPLASMIN: CPT

## 2025-08-30 PROCEDURE — 86665 EPSTEIN-BARR CAPSID VCA: CPT

## 2025-08-30 PROCEDURE — 6360000002 HC RX W HCPCS: Performed by: STUDENT IN AN ORGANIZED HEALTH CARE EDUCATION/TRAINING PROGRAM

## 2025-08-30 PROCEDURE — 36415 COLL VENOUS BLD VENIPUNCTURE: CPT

## 2025-08-30 PROCEDURE — 83516 IMMUNOASSAY NONANTIBODY: CPT

## 2025-08-30 PROCEDURE — 86644 CMV ANTIBODY: CPT

## 2025-08-30 PROCEDURE — 6370000000 HC RX 637 (ALT 250 FOR IP): Performed by: STUDENT IN AN ORGANIZED HEALTH CARE EDUCATION/TRAINING PROGRAM

## 2025-08-30 PROCEDURE — 6370000000 HC RX 637 (ALT 250 FOR IP): Performed by: PHYSICIAN ASSISTANT

## 2025-08-30 PROCEDURE — 83550 IRON BINDING TEST: CPT

## 2025-08-30 PROCEDURE — 82103 ALPHA-1-ANTITRYPSIN TOTAL: CPT

## 2025-08-30 PROCEDURE — 86694 HERPES SIMPLEX NES ANTBDY: CPT

## 2025-08-30 PROCEDURE — 2580000003 HC RX 258: Performed by: STUDENT IN AN ORGANIZED HEALTH CARE EDUCATION/TRAINING PROGRAM

## 2025-08-30 PROCEDURE — 85025 COMPLETE CBC W/AUTO DIFF WBC: CPT

## 2025-08-30 PROCEDURE — 83540 ASSAY OF IRON: CPT

## 2025-08-30 PROCEDURE — 6370000000 HC RX 637 (ALT 250 FOR IP): Performed by: INTERNAL MEDICINE

## 2025-08-30 PROCEDURE — 6360000002 HC RX W HCPCS: Performed by: NURSE PRACTITIONER

## 2025-08-30 PROCEDURE — 99231 SBSQ HOSP IP/OBS SF/LOW 25: CPT | Performed by: INTERNAL MEDICINE

## 2025-08-30 PROCEDURE — 80061 LIPID PANEL: CPT

## 2025-08-30 PROCEDURE — 86225 DNA ANTIBODY NATIVE: CPT

## 2025-08-30 PROCEDURE — 82105 ALPHA-FETOPROTEIN SERUM: CPT

## 2025-08-30 PROCEDURE — 99239 HOSP IP/OBS DSCHRG MGMT >30: CPT | Performed by: INTERNAL MEDICINE

## 2025-08-30 PROCEDURE — 86038 ANTINUCLEAR ANTIBODIES: CPT

## 2025-08-30 PROCEDURE — 2500000003 HC RX 250 WO HCPCS: Performed by: STUDENT IN AN ORGANIZED HEALTH CARE EDUCATION/TRAINING PROGRAM

## 2025-08-30 PROCEDURE — 82728 ASSAY OF FERRITIN: CPT

## 2025-08-30 PROCEDURE — 80074 ACUTE HEPATITIS PANEL: CPT

## 2025-08-30 RX ORDER — LANOLIN ALCOHOL/MO/W.PET/CERES
100 CREAM (GRAM) TOPICAL DAILY
Qty: 30 TABLET | Refills: 3 | Status: SHIPPED | OUTPATIENT
Start: 2025-08-31

## 2025-08-30 RX ORDER — PREDNISONE 10 MG/1
TABLET ORAL
Qty: 70 TABLET | Refills: 0 | Status: SHIPPED | OUTPATIENT
Start: 2025-08-30

## 2025-08-30 RX ORDER — ONDANSETRON 4 MG/1
4 TABLET, ORALLY DISINTEGRATING ORAL EVERY 8 HOURS PRN
Qty: 20 TABLET | Refills: 0 | Status: SHIPPED | OUTPATIENT
Start: 2025-08-30

## 2025-08-30 RX ORDER — PREDNISONE 10 MG/1
TABLET ORAL
Qty: 70 TABLET | Refills: 0 | Status: SHIPPED | OUTPATIENT
Start: 2025-08-30 | End: 2025-08-30

## 2025-08-30 RX ORDER — DICYCLOMINE HYDROCHLORIDE 10 MG/1
20 CAPSULE ORAL 4 TIMES DAILY PRN
Qty: 20 CAPSULE | Refills: 0 | Status: SHIPPED | OUTPATIENT
Start: 2025-08-30

## 2025-08-30 RX ADMIN — METRONIDAZOLE 500 MG: 500 INJECTION, SOLUTION INTRAVENOUS at 02:49

## 2025-08-30 RX ADMIN — ENOXAPARIN SODIUM 40 MG: 100 INJECTION SUBCUTANEOUS at 08:16

## 2025-08-30 RX ADMIN — METRONIDAZOLE 500 MG: 500 INJECTION, SOLUTION INTRAVENOUS at 08:21

## 2025-08-30 RX ADMIN — PREDNISOLONE 30 MG: 15 SOLUTION ORAL at 13:05

## 2025-08-30 RX ADMIN — FOLIC ACID 1 MG: 1 TABLET ORAL at 08:16

## 2025-08-30 RX ADMIN — SODIUM CHLORIDE: 0.9 INJECTION, SOLUTION INTRAVENOUS at 08:20

## 2025-08-30 RX ADMIN — MULTIVITAMIN TABLET 1 TABLET: TABLET at 08:16

## 2025-08-30 RX ADMIN — METRONIDAZOLE 500 MG: 500 INJECTION, SOLUTION INTRAVENOUS at 13:09

## 2025-08-30 RX ADMIN — WATER 1000 MG: 1 INJECTION INTRAMUSCULAR; INTRAVENOUS; SUBCUTANEOUS at 08:29

## 2025-08-30 RX ADMIN — DOCUSATE SODIUM 100 MG: 100 CAPSULE, LIQUID FILLED ORAL at 08:16

## 2025-08-30 RX ADMIN — PANTOPRAZOLE SODIUM 40 MG: 40 TABLET, DELAYED RELEASE ORAL at 06:42

## 2025-08-30 RX ADMIN — PREGABALIN 300 MG: 100 CAPSULE ORAL at 08:15

## 2025-08-30 RX ADMIN — Medication 100 MG: at 08:16

## 2025-08-31 LAB
AFP SERPL-MCNC: 8 UG/L
EBV VCA IGM SER-ACNC: 53 U/ML

## 2025-09-01 LAB
ANA SER QL IA: NEGATIVE
ANA SER QL IA: NEGATIVE
DSDNA IGG SER QL IA: 1 IU/ML
DSDNA IGG SER QL IA: 1.2 IU/ML
MITOCHONDRIA M2 IGG SER-ACNC: 0.9 U/ML (ref 0–4)
MITOCHONDRIA M2 IGG SER-ACNC: 1.4 U/ML (ref 0–4)
NUCLEAR IGG SER IA-RTO: 0.4 U/ML
NUCLEAR IGG SER IA-RTO: 0.4 U/ML
SMOOTH MUSCLE ANTIBODY: 7 UNITS (ref 0–19)

## 2025-09-02 ENCOUNTER — TELEPHONE (OUTPATIENT)
Dept: GASTROENTEROLOGY | Age: 50
End: 2025-09-02

## 2025-09-02 ENCOUNTER — HOSPITAL ENCOUNTER (OUTPATIENT)
Age: 50
Setting detail: SPECIMEN
Discharge: HOME | End: 2025-09-02

## 2025-09-02 DIAGNOSIS — K83.8 COMMON BILE DUCT DILATION: ICD-10-CM

## 2025-09-02 DIAGNOSIS — R74.8 ABNORMAL LIVER ENZYMES: ICD-10-CM

## 2025-09-02 DIAGNOSIS — K83.8 COMMON BILE DUCT DILATION: Primary | ICD-10-CM

## 2025-09-02 DIAGNOSIS — R39.9 UTI SYMPTOMS: ICD-10-CM

## 2025-09-02 LAB
ALBUMIN SERPL-MCNC: 3.4 G/DL (ref 3.5–5.2)
ALBUMIN/GLOB SERPL: 0.9 {RATIO} (ref 1–2.5)
ALP SERPL-CCNC: 174 U/L (ref 35–104)
ALT SERPL-CCNC: 52 U/L (ref 10–35)
AST SERPL-CCNC: 100 U/L (ref 10–35)
BILIRUB DIRECT SERPL-MCNC: 4.7 MG/DL (ref 0–0.2)
BILIRUB INDIRECT SERPL-MCNC: 1.5 MG/DL (ref 0–1)
BILIRUB SERPL-MCNC: 6.2 MG/DL (ref 0–1.2)
BILIRUB UR QL STRIP: ABNORMAL
CASTS #/AREA URNS LPF: NORMAL /LPF (ref 0–2)
CLARITY UR: CLEAR
COLOR UR: ABNORMAL
EPI CELLS #/AREA URNS HPF: NORMAL /HPF (ref 0–5)
GLOBULIN SER CALC-MCNC: 3.7 G/DL
GLUCOSE UR STRIP-MCNC: NEGATIVE MG/DL
HGB UR QL STRIP.AUTO: NEGATIVE
HSV1+2 IGM SER QL IA: 0.42
KETONES UR STRIP-MCNC: NEGATIVE MG/DL
LEUKOCYTE ESTERASE UR QL STRIP: ABNORMAL
NITRITE UR QL STRIP: NEGATIVE
PH UR STRIP: 6 [PH] (ref 5–8)
PROT SERPL-MCNC: 7.1 G/DL (ref 6.6–8.7)
PROT UR STRIP-MCNC: NEGATIVE MG/DL
RBC #/AREA URNS HPF: NORMAL /HPF (ref 0–2)
SP GR UR STRIP: 1.01 (ref 1–1.03)
UROBILINOGEN UR STRIP-ACNC: NORMAL EU/DL (ref 0–1)
WBC #/AREA URNS HPF: NORMAL /HPF (ref 0–5)

## 2025-09-03 ENCOUNTER — PATIENT MESSAGE (OUTPATIENT)
Dept: GASTROENTEROLOGY | Age: 50
End: 2025-09-03

## 2025-09-03 DIAGNOSIS — K76.0 HEPATIC STEATOSIS: ICD-10-CM

## 2025-09-03 DIAGNOSIS — K83.8 COMMON BILE DUCT DILATION: Primary | ICD-10-CM

## 2025-09-03 DIAGNOSIS — K70.10 ALCOHOLIC HEPATITIS WITHOUT ASCITES (HCC): ICD-10-CM

## 2025-09-03 DIAGNOSIS — R79.89 ABNORMAL LFTS: ICD-10-CM

## 2025-09-03 DIAGNOSIS — R17 JAUNDICE: ICD-10-CM

## 2025-09-03 DIAGNOSIS — R17 PAINLESS JAUNDICE: ICD-10-CM

## 2025-09-04 ENCOUNTER — OFFICE VISIT (OUTPATIENT)
Dept: FAMILY MEDICINE CLINIC | Age: 50
End: 2025-09-04
Payer: COMMERCIAL

## 2025-09-04 VITALS
DIASTOLIC BLOOD PRESSURE: 80 MMHG | OXYGEN SATURATION: 97 % | HEART RATE: 96 BPM | WEIGHT: 202.6 LBS | BODY MASS INDEX: 37.28 KG/M2 | HEIGHT: 62 IN | TEMPERATURE: 98.4 F | SYSTOLIC BLOOD PRESSURE: 130 MMHG

## 2025-09-04 DIAGNOSIS — R17 JAUNDICE: ICD-10-CM

## 2025-09-04 DIAGNOSIS — K76.0 HEPATIC STEATOSIS: ICD-10-CM

## 2025-09-04 DIAGNOSIS — R39.9 UTI SYMPTOMS: Primary | ICD-10-CM

## 2025-09-04 DIAGNOSIS — K70.10 ALCOHOLIC HEPATITIS WITHOUT ASCITES (HCC): ICD-10-CM

## 2025-09-04 DIAGNOSIS — R17 PAINLESS JAUNDICE: ICD-10-CM

## 2025-09-04 PROCEDURE — 99214 OFFICE O/P EST MOD 30 MIN: CPT | Performed by: NURSE PRACTITIONER

## 2025-09-04 PROCEDURE — 3075F SYST BP GE 130 - 139MM HG: CPT | Performed by: NURSE PRACTITIONER

## 2025-09-04 PROCEDURE — 3079F DIAST BP 80-89 MM HG: CPT | Performed by: NURSE PRACTITIONER

## 2025-09-04 ASSESSMENT — ENCOUNTER SYMPTOMS
ABDOMINAL PAIN: 1
NAUSEA: 0
SINUS PAIN: 0
SHORTNESS OF BREATH: 0
COUGH: 0
VOMITING: 0
DIARRHEA: 0
SORE THROAT: 0

## (undated) DEVICE — BITEBLOCK 54FR W/ DENT RIM BLOX

## (undated) DEVICE — DEFENDO AIR WATER SUCTION AND BIOPSY VALVE KIT FOR  OLYMPUS: Brand: DEFENDO AIR/WATER/SUCTION AND BIOPSY VALVE

## (undated) DEVICE — GOWN,POLY REINFORCED,LG: Brand: MEDLINE

## (undated) DEVICE — GLOVE ORANGE PI 7   MSG9070

## (undated) DEVICE — FORCEPS BX L240CM WRK CHN 2.8MM STD CAP W/ NDL MIC MESH

## (undated) DEVICE — JELLY,LUBE,STERILE,FLIP TOP,TUBE,2-OZ: Brand: MEDLINE